# Patient Record
Sex: MALE | Race: WHITE | NOT HISPANIC OR LATINO | Employment: OTHER | URBAN - METROPOLITAN AREA
[De-identification: names, ages, dates, MRNs, and addresses within clinical notes are randomized per-mention and may not be internally consistent; named-entity substitution may affect disease eponyms.]

---

## 2017-02-15 ENCOUNTER — OFFICE VISIT (OUTPATIENT)
Dept: MEDICAL GROUP | Facility: MEDICAL CENTER | Age: 67
End: 2017-02-15
Payer: MEDICARE

## 2017-02-15 VITALS
HEART RATE: 60 BPM | OXYGEN SATURATION: 96 % | WEIGHT: 238 LBS | BODY MASS INDEX: 28.98 KG/M2 | TEMPERATURE: 98.4 F | SYSTOLIC BLOOD PRESSURE: 130 MMHG | HEIGHT: 76 IN | DIASTOLIC BLOOD PRESSURE: 84 MMHG

## 2017-02-15 DIAGNOSIS — Z13.0 SCREENING FOR DEFICIENCY ANEMIA: ICD-10-CM

## 2017-02-15 DIAGNOSIS — R13.14 PHARYNGOESOPHAGEAL DYSPHAGIA: ICD-10-CM

## 2017-02-15 DIAGNOSIS — Z23 NEED FOR VACCINATION: ICD-10-CM

## 2017-02-15 DIAGNOSIS — Z12.11 SCREENING FOR COLON CANCER: ICD-10-CM

## 2017-02-15 DIAGNOSIS — Z13.29 SCREENING FOR THYROID DISORDER: ICD-10-CM

## 2017-02-15 DIAGNOSIS — E78.5 DYSLIPIDEMIA: ICD-10-CM

## 2017-02-15 DIAGNOSIS — Z13.220 SCREENING FOR HYPERLIPIDEMIA: ICD-10-CM

## 2017-02-15 DIAGNOSIS — K21.9 GASTROESOPHAGEAL REFLUX DISEASE, ESOPHAGITIS PRESENCE NOT SPECIFIED: ICD-10-CM

## 2017-02-15 DIAGNOSIS — R73.03 PREDIABETES: ICD-10-CM

## 2017-02-15 DIAGNOSIS — I10 ESSENTIAL HYPERTENSION: ICD-10-CM

## 2017-02-15 DIAGNOSIS — R97.20 ELEVATED PSA: ICD-10-CM

## 2017-02-15 DIAGNOSIS — K22.2 ESOPHAGEAL STRICTURE: ICD-10-CM

## 2017-02-15 PROCEDURE — 90715 TDAP VACCINE 7 YRS/> IM: CPT | Performed by: FAMILY MEDICINE

## 2017-02-15 PROCEDURE — 99214 OFFICE O/P EST MOD 30 MIN: CPT | Mod: 25 | Performed by: FAMILY MEDICINE

## 2017-02-15 PROCEDURE — 90471 IMMUNIZATION ADMIN: CPT | Performed by: FAMILY MEDICINE

## 2017-02-15 RX ORDER — VALSARTAN AND HYDROCHLOROTHIAZIDE 320; 25 MG/1; MG/1
1 TABLET, FILM COATED ORAL DAILY
Qty: 90 TAB | Refills: 3 | Status: SHIPPED | OUTPATIENT
Start: 2017-02-15 | End: 2018-02-20 | Stop reason: SDUPTHER

## 2017-02-15 RX ORDER — ATORVASTATIN CALCIUM 40 MG/1
40 TABLET, FILM COATED ORAL DAILY
Qty: 90 TAB | Refills: 3 | Status: SHIPPED | OUTPATIENT
Start: 2017-02-15 | End: 2017-10-24

## 2017-02-15 RX ORDER — VALSARTAN AND HYDROCHLOROTHIAZIDE 320; 25 MG/1; MG/1
TABLET, FILM COATED ORAL
COMMUNITY
Start: 2016-02-16 | End: 2017-02-15 | Stop reason: SDUPTHER

## 2017-02-15 RX ORDER — ASPIRIN 81 MG/1
81 TABLET ORAL
COMMUNITY

## 2017-02-15 RX ORDER — VALSARTAN AND HYDROCHLOROTHIAZIDE 320; 25 MG/1; MG/1
TABLET, FILM COATED ORAL
COMMUNITY
Start: 2016-12-28 | End: 2017-02-15

## 2017-02-15 RX ORDER — ATORVASTATIN CALCIUM 40 MG/1
40 TABLET, FILM COATED ORAL
COMMUNITY
Start: 2016-02-16 | End: 2017-02-15 | Stop reason: SDUPTHER

## 2017-02-15 ASSESSMENT — PATIENT HEALTH QUESTIONNAIRE - PHQ9: CLINICAL INTERPRETATION OF PHQ2 SCORE: 0

## 2017-02-15 NOTE — ASSESSMENT & PLAN NOTE
Gets a burning in his throat at night.  Especially after eating tomato based products. This has worsened over the last year. He takes Tums when it really bothers him. He is fairly resistant to taking a PPI. He does have a history of difficulty swallowing and sometimes food getting stuck. He has had an esophageal stricture that was dilated but that's been more than 10 years ago. He denies any hematemesis. No unusual weight loss.

## 2017-02-22 NOTE — PROGRESS NOTES
Chief Complaint   Patient presents with   • Establish Care   • Medication Refill     Bolivar is here to establish care at this office. he is a previous patient of mine at the [facility].      Bolivar Sykes is a 66 y.o. male here to establish care and for evaluation and management of:        HPI:    GERD (gastroesophageal reflux disease)  Gets a burning in his throat at night.  Especially after eating tomato based products. This has worsened over the last year. He takes Tums when it really bothers him. He is fairly resistant to taking a PPI. He does have a history of difficulty swallowing and sometimes food getting stuck. He has had an esophageal stricture that was dilated but that's been more than 10 years ago. He denies any hematemesis. No unusual weight loss.    Prediabetes  Patient has not been monitoring his diet. He denies any polyuria, polyphagia or polydipsia.    Dyslipidemia  Patient has been taking his atorvastatin 40 mg daily. He denies any excess muscle pain, jaundice or dark urine.      Allergies   Allergen Reactions   • Morphine        Current medicines (including changes today)  Current Outpatient Prescriptions   Medication Sig Dispense Refill   • aspirin (ECOTRIN LOW STRENGTH) 81 MG EC tablet Take 81 mg by mouth.     • Cholecalciferol (PA VITAMIN D-3) 2000 UNIT Cap Take 2,000 Units by mouth.     • valsartan-hydrochlorothiazide (DIOVAN-HCT) 320-25 MG per tablet Take 1 Tab by mouth every day. 90 Tab 3   • metoprolol (LOPRESSOR) 25 MG Tab Take 1 Tab by mouth 2 times a day. 180 Tab 3   • atorvastatin (LIPITOR) 40 MG Tab Take 1 Tab by mouth every day. 90 Tab 3     No current facility-administered medications for this visit.     He  has a past medical history of Hyperlipidemia; Hypertension; Sleep apnea; Tear of lateral cartilage or meniscus of knee, current (7/25/2012); Prediabetes (2/15/2017); and GERD (gastroesophageal reflux disease) (2/15/2017).  He  has past surgical history that includes appendectomy;  "hernia repair; and knee arthroscopy (Bilateral).  Social History   Substance Use Topics   • Smoking status: Never Smoker    • Smokeless tobacco: Never Used   • Alcohol Use: 8.4 oz/week     14 Cans of beer per week     Social History     Social History Narrative     Family History   Problem Relation Age of Onset   • Heart Attack Brother    • Hypertension Brother    • Stroke Brother    • Arthritis Mother    • Heart Disease Mother    • Hypertension Mother    • Heart Disease Father    • Hypertension Father      Family Status   Relation Status Death Age   • Brother     • Mother     • Father           ROS  No fever or chills.  No nausea or vomiting.  No chest pain or palpitations.  No cough or SOB.  No pain with urination or hematuria.  No black or bloody stools.  All other systems reviewed and are negative     Objective:     Blood pressure 130/84, pulse 60, temperature 36.9 °C (98.4 °F), height 1.93 m (6' 3.98\"), weight 107.956 kg (238 lb), SpO2 96 %. Body mass index is 28.98 kg/(m^2).  Physical Exam:      Well developed, well nourished.  Alert, oriented in no acute distress.  Psych: Eye contact is good, speech goal directed, affect calm  Eyes: conjunctiva non-injected, sclera non-icteric.  Ears: Pinna normal. TM pearly gray.   Nose: Nares are patent.  Normal mucosa  Mouth: Oral mucous membranes pink and moist with no lesions.  Neck Supple.  No adenopathy or masses in the neck or supraclavicular regions. No thyromegaly  Lungs: clear to auscultation bilaterally with good excursion. No wheezes or rhonchi  CV: regular rate and rhythm. No murmur  Abdomen: soft, nontender, no masses or organomegaly.  No rebound or guarding  Ext: no edema, color normal, vascularity normal, temperature normal      Assessment and Plan:   The following treatment plan was discussed    1. Dyslipidemia  Check labs. Refill atorvastatin.  - COMP METABOLIC PANEL  - LIPID PANEL W/ CHOL/HDL RATIO  - atorvastatin (LIPITOR) 40 MG " Tab; Take 1 Tab by mouth every day.  Dispense: 90 Tab; Refill: 3    2. Essential hypertension  Good control. Continue current medications.  - COMP METABOLIC PANEL  - valsartan-hydrochlorothiazide (DIOVAN-HCT) 320-25 MG per tablet; Take 1 Tab by mouth every day.  Dispense: 90 Tab; Refill: 3  - metoprolol (LOPRESSOR) 25 MG Tab; Take 1 Tab by mouth 2 times a day.  Dispense: 180 Tab; Refill: 3    3. Prediabetes  Dietary counseling done. Recheck hemoglobin A1c.  - COMP METABOLIC PANEL  - HGB A1C WITH EAG ESTIMATION  - MICROALB/CREAT RATIO RAND. UR    4. Gastroesophageal reflux disease, esophagitis presence not specified  Refer to GI. Given H. pylori. Recommend starting omeprazole 20 mg daily.  - REFERRAL TO GASTROENTEROLOGY  - CBC WITH DIFFERENTIAL  - H.PYLORI STOOL ANTIGEN    5. Screening for deficiency anemia  Screening labs ordered.  Await results for counseling.    - CBC WITH DIFFERENTIAL    6. Screening for hyperlipidemia  Screening labs ordered.  Await results for counseling.      7. Screening for thyroid disorder  Screening labs ordered.  Await results for counseling.    - TSH+FREE T4    8. Elevated PSA  Repeat PSA  - PROSTATE SPECIFIC AG    9. Screening for colon cancer     refer to gastroenterology- REFERRAL TO GASTROENTEROLOGY    10. Esophageal stricture  Referring gastroenterology  - REFERRAL TO GASTROENTEROLOGY  - H.PYLORI STOOL ANTIGEN    11. Pharyngoesophageal dysphagia  Refer to gastroenterology  - REFERRAL TO GASTROENTEROLOGY    12. Need for vaccination  Vaccines updated  - TDAP VACCINE =>6YO IM  - zoster vaccine live, PF, (ZOSTAVAX) 37753 UNT/0.65ML injection; Inject 0.65 mL as instructed Once for 1 dose.  Dispense: 0.65 mL; Refill: 0      Records reviewed    Any change or worsening of signs or symptoms, patient encouraged to follow-up or report to the emergency room for further evaluation. Patient understands and agrees.    Followup: Return in about 6 months (around 8/15/2017).

## 2017-02-22 NOTE — ASSESSMENT & PLAN NOTE
Patient has been taking his atorvastatin 40 mg daily. He denies any excess muscle pain, jaundice or dark urine.

## 2017-02-27 RX ORDER — AMOXICILLIN AND CLAVULANATE POTASSIUM 875; 125 MG/1; MG/1
1 TABLET, FILM COATED ORAL 2 TIMES DAILY
Qty: 20 TAB | Refills: 0 | Status: SHIPPED | OUTPATIENT
Start: 2017-02-27 | End: 2017-12-22

## 2017-03-20 LAB
ALBUMIN SERPL-MCNC: 4 G/DL (ref 3.6–4.8)
ALBUMIN/CREAT UR: <2.7 MG/G CREAT (ref 0–30)
ALBUMIN/GLOB SERPL: 1.5 {RATIO} (ref 1.2–2.2)
ALP SERPL-CCNC: 78 IU/L (ref 39–117)
ALT SERPL-CCNC: 33 IU/L (ref 0–44)
AST SERPL-CCNC: 24 IU/L (ref 0–40)
BASOPHILS # BLD AUTO: 0 X10E3/UL (ref 0–0.2)
BASOPHILS NFR BLD AUTO: 0 %
BILIRUB SERPL-MCNC: 0.6 MG/DL (ref 0–1.2)
BUN SERPL-MCNC: 16 MG/DL (ref 8–27)
BUN/CREAT SERPL: 22 (ref 10–22)
CALCIUM SERPL-MCNC: 8.8 MG/DL (ref 8.6–10.2)
CHLORIDE SERPL-SCNC: 105 MMOL/L (ref 96–106)
CHOLEST SERPL-MCNC: 151 MG/DL (ref 100–199)
CHOLEST/HDLC SERPL: 4 RATIO UNITS (ref 0–5)
CO2 SERPL-SCNC: 20 MMOL/L (ref 18–29)
COMMENT 011824: ABNORMAL
CREAT SERPL-MCNC: 0.72 MG/DL (ref 0.76–1.27)
CREAT UR-MCNC: 109.9 MG/DL
EOSINOPHIL # BLD AUTO: 0.3 X10E3/UL (ref 0–0.4)
EOSINOPHIL NFR BLD AUTO: 6 %
ERYTHROCYTE [DISTWIDTH] IN BLOOD BY AUTOMATED COUNT: 13.1 % (ref 12.3–15.4)
EST. AVERAGE GLUCOSE BLD GHB EST-MCNC: 131 MG/DL
GLOBULIN SER CALC-MCNC: 2.7 G/DL (ref 1.5–4.5)
GLUCOSE SERPL-MCNC: 116 MG/DL (ref 65–99)
H PYLORI AG STL QL IA: NEGATIVE
HBA1C MFR BLD: 6.2 % (ref 4.8–5.6)
HCT VFR BLD AUTO: 46.1 % (ref 37.5–51)
HDLC SERPL-MCNC: 38 MG/DL
HGB BLD-MCNC: 14.9 G/DL (ref 12.6–17.7)
IMM GRANULOCYTES # BLD: 0 X10E3/UL (ref 0–0.1)
IMM GRANULOCYTES NFR BLD: 0 %
IMMATURE CELLS  115398: NORMAL
LDLC SERPL CALC-MCNC: 95 MG/DL (ref 0–99)
LYMPHOCYTES # BLD AUTO: 1.5 X10E3/UL (ref 0.7–3.1)
LYMPHOCYTES NFR BLD AUTO: 27 %
MCH RBC QN AUTO: 30.7 PG (ref 26.6–33)
MCHC RBC AUTO-ENTMCNC: 32.3 G/DL (ref 31.5–35.7)
MCV RBC AUTO: 95 FL (ref 79–97)
MICROALBUMIN UR-MCNC: <3 UG/ML
MONOCYTES # BLD AUTO: 0.6 X10E3/UL (ref 0.1–0.9)
MONOCYTES NFR BLD AUTO: 10 %
MORPHOLOGY BLD-IMP: NORMAL
NEUTROPHILS # BLD AUTO: 3.2 X10E3/UL (ref 1.4–7)
NEUTROPHILS NFR BLD AUTO: 57 %
NRBC BLD AUTO-RTO: NORMAL %
PLATELET # BLD AUTO: 242 X10E3/UL (ref 150–379)
POTASSIUM SERPL-SCNC: 4.4 MMOL/L (ref 3.5–5.2)
PROT SERPL-MCNC: 6.7 G/DL (ref 6–8.5)
PSA SERPL-MCNC: 3.1 NG/ML (ref 0–4)
RBC # BLD AUTO: 4.86 X10E6/UL (ref 4.14–5.8)
SODIUM SERPL-SCNC: 142 MMOL/L (ref 134–144)
T4 FREE SERPL-MCNC: 1.16 NG/DL (ref 0.82–1.77)
TRIGL SERPL-MCNC: 90 MG/DL (ref 0–149)
TSH SERPL DL<=0.005 MIU/L-ACNC: 1.12 UIU/ML (ref 0.45–4.5)
VLDLC SERPL CALC-MCNC: 18 MG/DL (ref 5–40)
WBC # BLD AUTO: 5.7 X10E3/UL (ref 3.4–10.8)

## 2017-10-24 ENCOUNTER — HOSPITAL ENCOUNTER (OUTPATIENT)
Dept: RADIOLOGY | Facility: MEDICAL CENTER | Age: 67
End: 2017-10-24
Attending: FAMILY MEDICINE
Payer: MEDICARE

## 2017-10-24 ENCOUNTER — HOSPITAL ENCOUNTER (OUTPATIENT)
Dept: LAB | Facility: MEDICAL CENTER | Age: 67
End: 2017-10-24
Attending: FAMILY MEDICINE
Payer: MEDICARE

## 2017-10-24 ENCOUNTER — OFFICE VISIT (OUTPATIENT)
Dept: MEDICAL GROUP | Facility: MEDICAL CENTER | Age: 67
End: 2017-10-24
Payer: MEDICARE

## 2017-10-24 VITALS
OXYGEN SATURATION: 96 % | RESPIRATION RATE: 16 BRPM | BODY MASS INDEX: 28.01 KG/M2 | TEMPERATURE: 98 F | WEIGHT: 230 LBS | HEIGHT: 76 IN | HEART RATE: 60 BPM | DIASTOLIC BLOOD PRESSURE: 84 MMHG | SYSTOLIC BLOOD PRESSURE: 132 MMHG

## 2017-10-24 DIAGNOSIS — M79.10 MYALGIA: ICD-10-CM

## 2017-10-24 DIAGNOSIS — M25.512 CHRONIC PAIN OF BOTH SHOULDERS: ICD-10-CM

## 2017-10-24 DIAGNOSIS — M25.552 BILATERAL HIP PAIN: ICD-10-CM

## 2017-10-24 DIAGNOSIS — G89.29 CHRONIC PAIN OF BOTH SHOULDERS: ICD-10-CM

## 2017-10-24 DIAGNOSIS — M25.551 BILATERAL HIP PAIN: ICD-10-CM

## 2017-10-24 DIAGNOSIS — M25.511 CHRONIC PAIN OF BOTH SHOULDERS: ICD-10-CM

## 2017-10-24 LAB
BASOPHILS # BLD AUTO: 0.4 % (ref 0–1.8)
BASOPHILS # BLD: 0.03 K/UL (ref 0–0.12)
EOSINOPHIL # BLD AUTO: 0.23 K/UL (ref 0–0.51)
EOSINOPHIL NFR BLD: 3 % (ref 0–6.9)
ERYTHROCYTE [DISTWIDTH] IN BLOOD BY AUTOMATED COUNT: 42.4 FL (ref 35.9–50)
ERYTHROCYTE [SEDIMENTATION RATE] IN BLOOD BY WESTERGREN METHOD: 17 MM/HOUR (ref 0–20)
HCT VFR BLD AUTO: 45.6 % (ref 42–52)
HGB BLD-MCNC: 15.3 G/DL (ref 14–18)
IMM GRANULOCYTES # BLD AUTO: 0.03 K/UL (ref 0–0.11)
IMM GRANULOCYTES NFR BLD AUTO: 0.4 % (ref 0–0.9)
LYMPHOCYTES # BLD AUTO: 1.43 K/UL (ref 1–4.8)
LYMPHOCYTES NFR BLD: 18.5 % (ref 22–41)
MCH RBC QN AUTO: 30.7 PG (ref 27–33)
MCHC RBC AUTO-ENTMCNC: 33.6 G/DL (ref 33.7–35.3)
MCV RBC AUTO: 91.4 FL (ref 81.4–97.8)
MONOCYTES # BLD AUTO: 0.99 K/UL (ref 0–0.85)
MONOCYTES NFR BLD AUTO: 12.8 % (ref 0–13.4)
NEUTROPHILS # BLD AUTO: 5 K/UL (ref 1.82–7.42)
NEUTROPHILS NFR BLD: 64.9 % (ref 44–72)
NRBC # BLD AUTO: 0 K/UL
NRBC BLD AUTO-RTO: 0 /100 WBC
PLATELET # BLD AUTO: 268 K/UL (ref 164–446)
PMV BLD AUTO: 10.4 FL (ref 9–12.9)
RBC # BLD AUTO: 4.99 M/UL (ref 4.7–6.1)
RHEUMATOID FACT SER IA-ACNC: 31 IU/ML (ref 0–14)
WBC # BLD AUTO: 7.7 K/UL (ref 4.8–10.8)

## 2017-10-24 PROCEDURE — 85025 COMPLETE CBC W/AUTO DIFF WBC: CPT

## 2017-10-24 PROCEDURE — 99214 OFFICE O/P EST MOD 30 MIN: CPT | Performed by: FAMILY MEDICINE

## 2017-10-24 PROCEDURE — 73522 X-RAY EXAM HIPS BI 3-4 VIEWS: CPT

## 2017-10-24 PROCEDURE — 85652 RBC SED RATE AUTOMATED: CPT

## 2017-10-24 PROCEDURE — 36415 COLL VENOUS BLD VENIPUNCTURE: CPT

## 2017-10-24 PROCEDURE — 86038 ANTINUCLEAR ANTIBODIES: CPT

## 2017-10-24 PROCEDURE — 73030 X-RAY EXAM OF SHOULDER: CPT | Mod: RT

## 2017-10-24 PROCEDURE — 73030 X-RAY EXAM OF SHOULDER: CPT | Mod: LT

## 2017-10-24 PROCEDURE — 86431 RHEUMATOID FACTOR QUANT: CPT

## 2017-10-24 RX ORDER — DICLOFENAC SODIUM 75 MG/1
75 TABLET, DELAYED RELEASE ORAL 2 TIMES DAILY PRN
Qty: 60 TAB | Refills: 3 | Status: SHIPPED | OUTPATIENT
Start: 2017-10-24 | End: 2018-01-18 | Stop reason: SDUPTHER

## 2017-10-24 NOTE — PATIENT INSTRUCTIONS
Arthritis, Nonspecific  Arthritis is inflammation of a joint. This usually means pain, redness, warmth or swelling are present. One or more joints may be involved. There are a number of types of arthritis. Your caregiver may not be able to tell what type of arthritis you have right away.  CAUSES   The most common cause of arthritis is the wear and tear on the joint (osteoarthritis). This causes damage to the cartilage, which can break down over time. The knees, hips, back and neck are most often affected by this type of arthritis.  Other types of arthritis and common causes of joint pain include:  · Sprains and other injuries near the joint. Sometimes minor sprains and injuries cause pain and swelling that develop hours later.  · Rheumatoid arthritis. This affects hands, feet and knees. It usually affects both sides of your body at the same time. It is often associated with chronic ailments, fever, weight loss and general weakness.  · Crystal arthritis. Gout and pseudo gout can cause occasional acute severe pain, redness and swelling in the foot, ankle, or knee.  · Infectious arthritis. Bacteria can get into a joint through a break in overlying skin. This can cause infection of the joint. Bacteria and viruses can also spread through the blood and affect your joints.  · Drug, infectious and allergy reactions. Sometimes joints can become mildly painful and slightly swollen with these types of illnesses.  SYMPTOMS   · Pain is the main symptom.  · Your joint or joints can also be red, swollen and warm or hot to the touch.  · You may have a fever with certain types of arthritis, or even feel overall ill.  · The joint with arthritis will hurt with movement. Stiffness is present with some types of arthritis.  DIAGNOSIS   Your caregiver will suspect arthritis based on your description of your symptoms and on your exam. Testing may be needed to find the type of arthritis:  · Blood and sometimes urine tests.  · X-ray tests  and sometimes CT or MRI scans.  · Removal of fluid from the joint (arthrocentesis) is done to check for bacteria, crystals or other causes. Your caregiver (or a specialist) will numb the area over the joint with a local anesthetic, and use a needle to remove joint fluid for examination. This procedure is only minimally uncomfortable.  · Even with these tests, your caregiver may not be able to tell what kind of arthritis you have. Consultation with a specialist (rheumatologist) may be helpful.  TREATMENT   Your caregiver will discuss with you treatment specific to your type of arthritis. If the specific type cannot be determined, then the following general recommendations may apply.  Treatment of severe joint pain includes:  · Rest.  · Elevation.  · Anti-inflammatory medication (for example, ibuprofen) may be prescribed. Avoiding activities that cause increased pain.  · Only take over-the-counter or prescription medicines for pain and discomfort as recommended by your caregiver.  · Cold packs over an inflamed joint may be used for 10 to 15 minutes every hour. Hot packs sometimes feel better, but do not use overnight. Do not use hot packs if you are diabetic without your caregiver's permission.  · A cortisone shot into arthritic joints may help reduce pain and swelling.  · Any acute arthritis that gets worse over the next 1 to 2 days needs to be looked at to be sure there is no joint infection.  Long-term arthritis treatment involves modifying activities and lifestyle to reduce joint stress jarring. This can include weight loss. Also, exercise is needed to nourish the joint cartilage and remove waste. This helps keep the muscles around the joint strong.  HOME CARE INSTRUCTIONS   · Do not take aspirin to relieve pain if gout is suspected. This elevates uric acid levels.  · Only take over-the-counter or prescription medicines for pain, discomfort or fever as directed by your caregiver.  · Rest the joint as much as  possible.  · If your joint is swollen, keep it elevated.  · Use crutches if the painful joint is in your leg.  · Drinking plenty of fluids may help for certain types of arthritis.  · Follow your caregiver's dietary instructions.  · Try low-impact exercise such as:  ¨ Swimming.  ¨ Water aerobics.  ¨ Biking.  ¨ Walking.  · Morning stiffness is often relieved by a warm shower.  · Put your joints through regular range-of-motion.  SEEK MEDICAL CARE IF:   · You do not feel better in 24 hours or are getting worse.  · You have side effects to medications, or are not getting better with treatment.  SEEK IMMEDIATE MEDICAL CARE IF:   · You have a fever.  · You develop severe joint pain, swelling or redness.  · Many joints are involved and become painful and swollen.  · There is severe back pain and/or leg weakness.  · You have loss of bowel or bladder control.     This information is not intended to replace advice given to you by your health care provider. Make sure you discuss any questions you have with your health care provider.     Document Released: 01/25/2006 Document Revised: 01/08/2016 Document Reviewed: 03/14/2016  [employer] Patient Education ©2016 [employer]

## 2017-10-24 NOTE — ASSESSMENT & PLAN NOTE
Started in July.  Pain in both shoulders and hips.  Worse when he has been sitting.  In June he cut and stacked 7 cords of wood.  Pain is worse in the morning,

## 2017-10-25 PROBLEM — M05.80 POLYARTHRITIS WITH POSITIVE RHEUMATOID FACTOR (HCC): Status: ACTIVE | Noted: 2017-10-25

## 2017-10-25 LAB — NUCLEAR IGG SER QL IA: NORMAL

## 2017-10-31 NOTE — PROGRESS NOTES
Subjective:     Chief Complaint   Patient presents with   • Shoulder Pain   • Muscle Pain       Bolivar MANOLO Sykes is a 67 y.o. male here today for evaluation and management of:    Myalgia  Started in July.  Pain in both shoulders and hips.  Worse when he has been sitting.  In June he cut and stacked 7 cords of wood.  Pain is worse in the morning,    Bilateral hip pain  Patient complains of bilateral hip pain and shoulder pain over the last year. It seems to be worsening. He denies any redness or swelling but it does affect his activities. As he is having muscle aches in his back muscles. He denies any rashes or known tick bites. No fever or chills. No unusual weight loss.       Allergies   Allergen Reactions   • Morphine        Current medicines (including changes today)  Current Outpatient Prescriptions   Medication Sig Dispense Refill   • diclofenac EC (VOLTAREN) 75 MG Tablet Delayed Response Take 1 Tab by mouth 2 times a day as needed (pain). 60 Tab 3   • amoxicillin-clavulanate (AUGMENTIN) 875-125 MG Tab Take 1 Tab by mouth 2 times a day. 20 Tab 0   • aspirin (ECOTRIN LOW STRENGTH) 81 MG EC tablet Take 81 mg by mouth.     • Cholecalciferol (PA VITAMIN D-3) 2000 UNIT Cap Take 2,000 Units by mouth.     • valsartan-hydrochlorothiazide (DIOVAN-HCT) 320-25 MG per tablet Take 1 Tab by mouth every day. 90 Tab 3   • metoprolol (LOPRESSOR) 25 MG Tab Take 1 Tab by mouth 2 times a day. 180 Tab 3     No current facility-administered medications for this visit.        He  has a past medical history of GERD (gastroesophageal reflux disease) (2/15/2017); Hyperlipidemia; Hypertension; Prediabetes (2/15/2017); Sleep apnea; and Tear of lateral cartilage or meniscus of knee, current (7/25/2012).    Patient Active Problem List    Diagnosis Date Noted   • Essential hypertension 08/20/2013     Priority: High   • Dyslipidemia 08/20/2013     Priority: High   • CESAR (obstructive sleep apnea) 08/20/2013     Priority: High   • Family  "history of premature CAD 08/20/2013     Priority: High   • Tear of lateral cartilage or meniscus of knee, current 07/25/2012     Priority: Low   • Polyarthritis with positive rheumatoid factor (CMS-formerly [facility]) 10/25/2017   • Myalgia 10/24/2017   • Chronic pain of both shoulders 10/24/2017   • Bilateral hip pain 10/24/2017   • Prediabetes 02/15/2017   • GERD (gastroesophageal reflux disease) 02/15/2017   • Pharyngoesophageal dysphagia 02/15/2017   • Esophageal stricture 02/15/2017       ROS   No fever or chills.  No nausea or vomiting.  No chest pain or palpitations.  No cough or SOB.  No pain with urination or hematuria.  No black or bloody stools.       Objective:     Blood pressure 132/84, pulse 60, temperature 36.7 °C (98 °F), resp. rate 16, height 1.93 m (6' 3.98\"), weight 104.3 kg (230 lb), SpO2 96 %. Body mass index is 28.01 kg/m².   Physical Exam:  Well developed, well nourished.  Alert, oriented in no acute distress.  Eye contact is good, speech goal directed, affect calm  Eyes: conjunctiva non-injected, sclera non-icteric.  Neck Supple.  No adenopathy or masses in the neck or supraclavicular regions. No thyromegaly  Lungs: clear to auscultation bilaterally with good excursion. No wheezes or rhonchi  CV: regular rate and rhythm. No murmur  Neck exam: No spinal tenderness to palpation. Normal flexion, extension and lateral rotation ROM. Spurling's test Negative.   Shoulder/arm exam: No deformity, erythema, edema or ecchymosis. Tenderness to palpation at AC joint bilaterally . ROM intact within normal limits. Hawkin's test negative. Neer's test negative.  Empty can test negative. Drop arm test negative. 5/5 strength bilaterally.   Hip: Tenderness to palpation on . lateral compression normal flexion, extension, abduction and adduction ROM. No pain with axial load or internal rotation.   Ext: no edema, color normal, vascularity normal, temperature normal          Assessment and Plan:   The following treatment plan was " discussed    1. Myalgia  Rule out autoimmune arthritis. Voltaren as needed for pain. Await results  - CBC WITH DIFFERENTIAL; Future  - CHRISTIAN ANTIBODY WITH REFLEX; Future  - WESTERGREN SED RATE; Future  - RHEUMATOID ARTHRITIS FACTOR; Future  - diclofenac EC (VOLTAREN) 75 MG Tablet Delayed Response; Take 1 Tab by mouth 2 times a day as needed (pain).  Dispense: 60 Tab; Refill: 3    2. Chronic pain of both shoulders  X-ray to assess  - DX-SHOULDER 2+ LEFT; Future  - DX-SHOULDER 2+ RIGHT; Future    3. Bilateral hip pain  X-ray to assess  - DX-HIP-BILATERAL-WITH PELVIS-3/4 VIEWS; Future    Any change or worsening of signs or symptoms, patient encouraged to follow-up or report to the emergency room for further evaluation. Patient understands and agrees.    Followup: Return if symptoms worsen or fail to improve.

## 2017-10-31 NOTE — ASSESSMENT & PLAN NOTE
Patient complains of bilateral hip pain and shoulder pain over the last year. It seems to be worsening. He denies any redness or swelling but it does affect his activities. As he is having muscle aches in his back muscles. He denies any rashes or known tick bites. No fever or chills. No unusual weight loss.

## 2017-11-03 DIAGNOSIS — M05.80 POLYARTHRITIS WITH POSITIVE RHEUMATOID FACTOR (HCC): ICD-10-CM

## 2017-12-06 RX ORDER — AZITHROMYCIN 250 MG/1
TABLET, FILM COATED ORAL
Qty: 6 TAB | Refills: 0 | Status: SHIPPED | OUTPATIENT
Start: 2017-12-06 | End: 2017-12-11

## 2017-12-26 ENCOUNTER — OFFICE VISIT (OUTPATIENT)
Dept: MEDICAL GROUP | Facility: MEDICAL CENTER | Age: 67
End: 2017-12-26
Payer: MEDICARE

## 2017-12-26 ENCOUNTER — HOSPITAL ENCOUNTER (OUTPATIENT)
Dept: LAB | Facility: MEDICAL CENTER | Age: 67
End: 2017-12-26
Attending: INTERNAL MEDICINE
Payer: MEDICARE

## 2017-12-26 VITALS
BODY MASS INDEX: 28.98 KG/M2 | TEMPERATURE: 98 F | OXYGEN SATURATION: 97 % | DIASTOLIC BLOOD PRESSURE: 82 MMHG | SYSTOLIC BLOOD PRESSURE: 134 MMHG | HEIGHT: 76 IN | HEART RATE: 71 BPM | RESPIRATION RATE: 16 BRPM | WEIGHT: 238 LBS

## 2017-12-26 DIAGNOSIS — H90.0 CONDUCTIVE HEARING LOSS, BILATERAL: ICD-10-CM

## 2017-12-26 DIAGNOSIS — R89.9 ABNORMAL LABORATORY TEST RESULT: ICD-10-CM

## 2017-12-26 DIAGNOSIS — H61.23 BILATERAL IMPACTED CERUMEN: ICD-10-CM

## 2017-12-26 LAB
CRP SERPL HS-MCNC: 0.07 MG/DL (ref 0–0.75)
ERYTHROCYTE [SEDIMENTATION RATE] IN BLOOD BY WESTERGREN METHOD: 6 MM/HOUR (ref 0–20)

## 2017-12-26 PROCEDURE — 99214 OFFICE O/P EST MOD 30 MIN: CPT | Performed by: FAMILY MEDICINE

## 2017-12-26 PROCEDURE — 86140 C-REACTIVE PROTEIN: CPT

## 2017-12-26 PROCEDURE — 36415 COLL VENOUS BLD VENIPUNCTURE: CPT

## 2017-12-26 PROCEDURE — 85652 RBC SED RATE AUTOMATED: CPT

## 2017-12-26 PROCEDURE — 86200 CCP ANTIBODY: CPT

## 2017-12-26 NOTE — PROGRESS NOTES
Subjective:     Chief Complaint   Patient presents with   • Ear Fullness       Bolivar MANOLO Sykes is a 67 y.o. male here today for evaluation and management of:    Conductive hearing loss, bilateral  Patient complains of significant hearing loss. He has been told that he had a lot of wax in his ears in the past but didn't want to have them washed out. He reports that he was going over the past and with the change in altitude he had abrupt loss of hearing bilaterally. He denies any pain or rhinorrhea.       Allergies   Allergen Reactions   • Morphine        Current medicines (including changes today)  Current Outpatient Prescriptions   Medication Sig Dispense Refill   • atorvastatin (LIPITOR) 40 MG Tab      • diclofenac EC (VOLTAREN) 75 MG Tablet Delayed Response Take 1 Tab by mouth 2 times a day as needed (pain). 60 Tab 3   • aspirin (ECOTRIN LOW STRENGTH) 81 MG EC tablet Take 81 mg by mouth.     • Cholecalciferol (PA VITAMIN D-3) 2000 UNIT Cap Take 2,000 Units by mouth.     • valsartan-hydrochlorothiazide (DIOVAN-HCT) 320-25 MG per tablet Take 1 Tab by mouth every day. 90 Tab 3   • metoprolol (LOPRESSOR) 25 MG Tab Take 1 Tab by mouth 2 times a day. 180 Tab 3     No current facility-administered medications for this visit.        He  has a past medical history of GERD (gastroesophageal reflux disease) (2/15/2017); Hyperlipidemia; Hypertension; Prediabetes (2/15/2017); Sleep apnea; and Tear of lateral cartilage or meniscus of knee, current (7/25/2012).    Patient Active Problem List    Diagnosis Date Noted   • Essential hypertension 08/20/2013     Priority: High   • Dyslipidemia 08/20/2013     Priority: High   • CESAR (obstructive sleep apnea) 08/20/2013     Priority: High   • Family history of premature CAD 08/20/2013     Priority: High   • Tear of lateral cartilage or meniscus of knee, current 07/25/2012     Priority: Low   • Conductive hearing loss, bilateral 12/26/2017   • Bilateral impacted cerumen 12/26/2017   •  "Polyarthritis with positive rheumatoid factor (CMS-HCC) 10/25/2017   • Myalgia 10/24/2017   • Chronic pain of both shoulders 10/24/2017   • Bilateral hip pain 10/24/2017   • Prediabetes 02/15/2017   • GERD (gastroesophageal reflux disease) 02/15/2017   • Pharyngoesophageal dysphagia 02/15/2017   • Esophageal stricture 02/15/2017       ROS   No fever or chills.  No nausea or vomiting.  No chest pain or palpitations.  No cough or SOB.  No pain with urination or hematuria.  No black or bloody stools.       Objective:     Blood pressure 134/82, pulse 71, temperature 36.7 °C (98 °F), resp. rate 16, height 1.93 m (6' 4\"), weight 108 kg (238 lb), SpO2 97 %. Body mass index is 28.97 kg/m².   Physical Exam:  Well developed, well nourished.  Alert, oriented in no acute distress.  Eye contact is good, speech goal directed, affect calm  Eyes: conjunctiva non-injected, sclera non-icteric.  Ears: Pinna normal. Canals totally occluded with large amount of wax bilaterally. After ear wash TMs are visualized and are pearly gray.   Lungs: clear to auscultation bilaterally with good excursion. No wheezes or rhonchi  CV: regular rate and rhythm. No murmur      Assessment and Plan:   The following treatment plan was discussed    1. Conductive hearing loss, bilateral  Ear wash done with good effect. Symptoms resolved    2. Bilateral impacted cerumen  Ear wash done with good effect. Discussed using MARTIN Kelley monthly. Follow-up as needed    Any change or worsening of signs or symptoms, patient encouraged to follow-up or report to the emergency room for further evaluation. Patient understands and agrees.    Followup: Return if symptoms worsen or fail to improve.           "

## 2017-12-26 NOTE — ASSESSMENT & PLAN NOTE
Patient complains of significant hearing loss. He has been told that he had a lot of wax in his ears in the past but didn't want to have them washed out. He reports that he was going over the past and with the change in altitude he had abrupt loss of hearing bilaterally. He denies any pain or rhinorrhea.

## 2017-12-27 LAB — CCP IGG SERPL-ACNC: 6 UNITS (ref 0–19)

## 2018-01-18 DIAGNOSIS — M79.10 MYALGIA: ICD-10-CM

## 2018-01-18 RX ORDER — DICLOFENAC SODIUM 75 MG/1
75 TABLET, DELAYED RELEASE ORAL 2 TIMES DAILY PRN
Qty: 60 TAB | Refills: 3 | Status: SHIPPED | OUTPATIENT
Start: 2018-01-18 | End: 2018-02-20 | Stop reason: SDUPTHER

## 2018-01-18 NOTE — TELEPHONE ENCOUNTER
----- Message from Bolivar Sykes sent at 1/18/2018  7:21 AM PST -----  Regarding: Prescription Question  Contact: [phone]  Syed Castellano, could you please send my prescription for Diclofenac to my mail in [employer] insurance.  Thanks Bolivar Sykes

## 2018-02-20 ENCOUNTER — OFFICE VISIT (OUTPATIENT)
Dept: MEDICAL GROUP | Facility: MEDICAL CENTER | Age: 68
End: 2018-02-20
Payer: MEDICARE

## 2018-02-20 VITALS
RESPIRATION RATE: 16 BRPM | WEIGHT: 243 LBS | HEART RATE: 60 BPM | TEMPERATURE: 97.1 F | OXYGEN SATURATION: 96 % | HEIGHT: 76 IN | BODY MASS INDEX: 29.59 KG/M2 | SYSTOLIC BLOOD PRESSURE: 124 MMHG | DIASTOLIC BLOOD PRESSURE: 78 MMHG

## 2018-02-20 DIAGNOSIS — M25.551 BILATERAL HIP PAIN: ICD-10-CM

## 2018-02-20 DIAGNOSIS — R73.03 PREDIABETES: ICD-10-CM

## 2018-02-20 DIAGNOSIS — M25.511 CHRONIC PAIN OF BOTH SHOULDERS: ICD-10-CM

## 2018-02-20 DIAGNOSIS — G47.33 OSA (OBSTRUCTIVE SLEEP APNEA): ICD-10-CM

## 2018-02-20 DIAGNOSIS — I10 ESSENTIAL HYPERTENSION: ICD-10-CM

## 2018-02-20 DIAGNOSIS — Z12.11 SCREENING FOR COLON CANCER: ICD-10-CM

## 2018-02-20 DIAGNOSIS — K21.9 GASTROESOPHAGEAL REFLUX DISEASE, ESOPHAGITIS PRESENCE NOT SPECIFIED: ICD-10-CM

## 2018-02-20 DIAGNOSIS — M25.552 BILATERAL HIP PAIN: ICD-10-CM

## 2018-02-20 DIAGNOSIS — M05.80 POLYARTHRITIS WITH POSITIVE RHEUMATOID FACTOR (HCC): ICD-10-CM

## 2018-02-20 DIAGNOSIS — E78.5 DYSLIPIDEMIA: ICD-10-CM

## 2018-02-20 DIAGNOSIS — Z00.00 MEDICARE ANNUAL WELLNESS VISIT, SUBSEQUENT: ICD-10-CM

## 2018-02-20 DIAGNOSIS — M79.10 MYALGIA: ICD-10-CM

## 2018-02-20 DIAGNOSIS — G89.29 CHRONIC PAIN OF BOTH SHOULDERS: ICD-10-CM

## 2018-02-20 DIAGNOSIS — M25.512 CHRONIC PAIN OF BOTH SHOULDERS: ICD-10-CM

## 2018-02-20 PROBLEM — R13.14 PHARYNGOESOPHAGEAL DYSPHAGIA: Status: RESOLVED | Noted: 2017-02-15 | Resolved: 2018-02-20

## 2018-02-20 PROBLEM — H90.0 CONDUCTIVE HEARING LOSS, BILATERAL: Status: RESOLVED | Noted: 2017-12-26 | Resolved: 2018-02-20

## 2018-02-20 PROBLEM — H61.23 BILATERAL IMPACTED CERUMEN: Status: RESOLVED | Noted: 2017-12-26 | Resolved: 2018-02-20

## 2018-02-20 PROCEDURE — G0439 PPPS, SUBSEQ VISIT: HCPCS | Performed by: FAMILY MEDICINE

## 2018-02-20 RX ORDER — VALSARTAN AND HYDROCHLOROTHIAZIDE 320; 25 MG/1; MG/1
1 TABLET, FILM COATED ORAL DAILY
Qty: 90 TAB | Refills: 3 | Status: SHIPPED | OUTPATIENT
Start: 2018-02-20 | End: 2018-07-31

## 2018-02-20 RX ORDER — DICLOFENAC SODIUM 75 MG/1
75 TABLET, DELAYED RELEASE ORAL 2 TIMES DAILY PRN
Qty: 180 TAB | Refills: 3 | Status: SHIPPED | OUTPATIENT
Start: 2018-02-20 | End: 2019-02-13 | Stop reason: SDUPTHER

## 2018-02-20 RX ORDER — ATORVASTATIN CALCIUM 40 MG/1
40 TABLET, FILM COATED ORAL DAILY
Qty: 90 TAB | Refills: 3 | Status: SHIPPED | OUTPATIENT
Start: 2018-02-20 | End: 2019-02-13 | Stop reason: SDUPTHER

## 2018-02-20 ASSESSMENT — PATIENT HEALTH QUESTIONNAIRE - PHQ9: CLINICAL INTERPRETATION OF PHQ2 SCORE: 0

## 2018-02-20 ASSESSMENT — ACTIVITIES OF DAILY LIVING (ADL): BATHING_REQUIRES_ASSISTANCE: 0

## 2018-02-27 NOTE — PROGRESS NOTES
Chief Complaint   Patient presents with   • Annual Exam         HPI:  Bolivar Sykes is a 67 y.o. here for Medicare Annual Wellness Visit     Patient Active Problem List    Diagnosis Date Noted   • Essential hypertension 08/20/2013     Priority: High   • Dyslipidemia 08/20/2013     Priority: High   • CESAR (obstructive sleep apnea) 08/20/2013     Priority: High   • Family history of premature CAD 08/20/2013     Priority: High   • Tear of lateral cartilage or meniscus of knee, current 07/25/2012     Priority: Low   • Polyarthritis with positive rheumatoid factor (CMS-HCC) 10/25/2017   • Chronic pain of both shoulders 10/24/2017   • Bilateral hip pain 10/24/2017   • Prediabetes 02/15/2017   • GERD (gastroesophageal reflux disease) 02/15/2017   • Esophageal stricture 02/15/2017       Current Outpatient Prescriptions   Medication Sig Dispense Refill   • metoprolol (LOPRESSOR) 25 MG Tab Take 1 Tab by mouth 2 times a day. 180 Tab 3   • valsartan-hydrochlorothiazide (DIOVAN-HCT) 320-25 MG per tablet Take 1 Tab by mouth every day. 90 Tab 3   • atorvastatin (LIPITOR) 40 MG Tab Take 1 Tab by mouth every day. 90 Tab 3   • diclofenac EC (VOLTAREN) 75 MG Tablet Delayed Response Take 1 Tab by mouth 2 times a day as needed (pain). 180 Tab 3   • aspirin (ECOTRIN LOW STRENGTH) 81 MG EC tablet Take 81 mg by mouth.     • Cholecalciferol (PA VITAMIN D-3) 2000 UNIT Cap Take 2,000 Units by mouth.       No current facility-administered medications for this visit.             Current supplements as per medication list.       Allergies: Morphine    Current social contact/activities:      He  reports that he has never smoked. He has never used smokeless tobacco. He reports that he drinks about 8.4 oz of alcohol per week . He reports that he does not use drugs.  Counseling given: Not Answered        DPA/Advanced Directive:  Patient does not have an Advanced Directive.  A packet and workshop information was given on Advanced  Directives.      ROS:    Gait: Uses no assistive device   Ostomy: no   Other tubes: no   Amputations: no   Chronic oxygen use: no   Last eye exam:in the last 12 months   : Denies any urinary leakage during the last 6 months incontinence.       Screening:    Depression Screening    Little interest or pleasure in doing things?  0 - not at all  Feeling down, depressed , or hopeless? 0 - not at all  Patient Health Questionnaire Score: 0     If depressive symptoms identified deferred to follow up visit unless specifically addressed in assessment and plan.    Interpretation of PHQ-9 Total Score   Score Severity   1-4 No Depression   5-9 Mild Depression   10-14 Moderate Depression   15-19 Moderately Severe Depression   20-27 Severe Depression    Screening for Cognitive Impairment    Three Minute Recall (apple, watch, thu)  3/3    Draw clock face with all 12 numbers set to the hand to show 10 minutes past 11 o'clock  1    Cognitive concerns identified deferred for follow up unless specifically addressed in assessment and plan.    Fall Risk Assessment    Has the patient had two or more falls in the last year or any fall with injury in the last year?  No    Safety Assessment    Throw rugs on floor.  Yes  Handrails on all stairs.  Yes  Good lighting in all hallways.  Yes  Difficulty hearing.  No  Patient counseled about all safety risks that were identified.    Functional Assessment ADLs    Are there any barriers preventing you from cooking for yourself or meeting nutritional needs?  No.    Are there any barriers preventing you from driving safely or obtaining transportation?  No.    Are there any barriers preventing you from using a telephone or calling for help?  No.    Are there any barriers preventing you from shopping?  No.    Are there any barriers preventing you from taking care of your own finances?  No.    Are there any barriers preventing you from managing your medications?  No.    Are there any barriers  "preventing you from showering/bathing yourself?  No.    Are currently engaging any exercise or physical activity?  No.       Health Maintenance Summary                COLON CANCER SCREENING ANNUAL FIT Overdue 7/8/1999     IMM ZOSTER VACCINE Overdue 7/8/2009     IMM INFLUENZA Overdue 9/1/2017     IMM DTaP/Tdap/Td Vaccine Next Due 2/15/2027      Done 2/15/2017 Imm Admin: Tdap Vaccine          Patient Care Team:  Nona Mcmahan M.D. as PCP - General (Family Medicine)        Social History   Substance Use Topics   • Smoking status: Never Smoker   • Smokeless tobacco: Never Used   • Alcohol use 8.4 oz/week     14 Cans of beer per week     Family History   Problem Relation Age of Onset   • Heart Attack Brother    • Hypertension Brother    • Stroke Brother    • Arthritis Mother    • Heart Disease Mother    • Hypertension Mother    • Heart Disease Father    • Hypertension Father      He  has a past medical history of GERD (gastroesophageal reflux disease) (2/15/2017); Hyperlipidemia; Hypertension; Prediabetes (2/15/2017); Sleep apnea; and Tear of lateral cartilage or meniscus of knee, current (7/25/2012).   Past Surgical History:   Procedure Laterality Date   • APPENDECTOMY     • HERNIA REPAIR     • KNEE ARTHROSCOPY Bilateral     4 total       Exam:     Blood pressure 124/78, pulse 60, temperature 36.2 °C (97.1 °F), resp. rate 16, height 1.93 m (6' 4\"), weight 110.2 kg (243 lb), SpO2 96 %. Body mass index is 29.58 kg/m².    Hearing fair.    Dentition good  Alert, oriented in no acute distress.  Eye contact is good, speech goal directed, affect calm  Ears: Pinnae are normal. TMs are clear bilaterally  Nose: Nares are patent. No discharge  Throat: Mucous membranes are moist without erythema or exudate  Neck: Supple without adenopathy  Lungs: Clear to auscultation bilaterally without wheezes or rhonchi  CV: Regular rate and rhythm without murmur  Abdomen: Soft, nontender, no masses or organomegaly  Genitalia: normal " circumcised penis  Rectal: sphincter tone normal, no mass, stool guaiac negative  Prostate: Normal size, symmetrical bilaterally, no nodules or tenderness      Assessment and Plan. The following treatment and monitoring plan is recommended:    1. Medicare annual wellness visit, subsequent  Routine anticipatory guidance. No special services needed at this time    2. Essential hypertension  Stable. Continue current medications  - COMP METABOLIC PANEL  - LIPID PANEL W/ CHOL/HDL RATIO  - metoprolol (LOPRESSOR) 25 MG Tab; Take 1 Tab by mouth 2 times a day.  Dispense: 180 Tab; Refill: 3  - valsartan-hydrochlorothiazide (DIOVAN-HCT) 320-25 MG per tablet; Take 1 Tab by mouth every day.  Dispense: 90 Tab; Refill: 3    3. Dyslipidemia  Check lipid panel.    consider different statin  - LIPID PANEL W/ CHOL/HDL RATIO    4. CESAR (obstructive sleep apnea)  Continue CPAP    5. Prediabetes  Dietary counseling done  - COMP METABOLIC PANEL  - HGB A1C WITH EAG ESTIMATION    6. Gastroesophageal reflux disease, esophagitis presence not specified  Stable. Continue dietary modifications    7. Bilateral hip pain  Consider orthopedic referral  - diclofenac EC (VOLTAREN) 75 MG Tablet Delayed Response; Take 1 Tab by mouth 2 times a day as needed (pain).  Dispense: 180 Tab; Refill: 3    8. Chronic pain of both shoulders  Stable.  - diclofenac EC (VOLTAREN) 75 MG Tablet Delayed Response; Take 1 Tab by mouth 2 times a day as needed (pain).  Dispense: 180 Tab; Refill: 3    9. Polyarthritis with positive rheumatoid factor (CMS-HCC)  Patient has not seen rheumatology  - diclofenac EC (VOLTAREN) 75 MG Tablet Delayed Response; Take 1 Tab by mouth 2 times a day as needed (pain).  Dispense: 180 Tab; Refill: 3    10. Screening for colon cancer    - OCCULT BLOOD FECES IMMUNOASSAY (FIT); Future    11. Myalgia    - diclofenac EC (VOLTAREN) 75 MG Tablet Delayed Response; Take 1 Tab by mouth 2 times a day as needed (pain).  Dispense: 180 Tab; Refill:  3        Services suggested: No services needed at this time  Health Care Screening: Age-appropriate preventive services Medicare covers discussed today and ordered if indicated.  Referrals offered: Community-based lifestyle interventions to reduce health risks and promote self-management and wellness, fall prevention, nutrition, physical activity, tobacco-use cessation, weight loss, and mental health services as per orders if indicated.    Discussion today about general wellness and lifestyle habits:    · Prevent falls and reduce trip hazards; Cautioned about securing or removing rugs.  · Have a working fire alarm and carbon monoxide detector;   · Engage in regular physical activity and social activities       Follow-up: Return in about 6 months (around 8/20/2018).

## 2018-03-09 LAB
ALBUMIN SERPL-MCNC: 4.3 G/DL (ref 3.6–4.8)
ALBUMIN/GLOB SERPL: 1.7 {RATIO} (ref 1.2–2.2)
ALP SERPL-CCNC: 74 IU/L (ref 39–117)
ALT SERPL-CCNC: 36 IU/L (ref 0–44)
AST SERPL-CCNC: 28 IU/L (ref 0–40)
BILIRUB SERPL-MCNC: 0.9 MG/DL (ref 0–1.2)
BUN SERPL-MCNC: 25 MG/DL (ref 8–27)
BUN/CREAT SERPL: 35 (ref 10–24)
CALCIUM SERPL-MCNC: 8.9 MG/DL (ref 8.6–10.2)
CHLORIDE SERPL-SCNC: 102 MMOL/L (ref 96–106)
CHOLEST SERPL-MCNC: 146 MG/DL (ref 100–199)
CHOLEST/HDLC SERPL: 4.4 RATIO UNITS (ref 0–5)
CO2 SERPL-SCNC: 23 MMOL/L (ref 18–29)
CREAT SERPL-MCNC: 0.71 MG/DL (ref 0.76–1.27)
EST. AVERAGE GLUCOSE BLD GHB EST-MCNC: 123 MG/DL
GFR SERPLBLD CREATININE-BSD FMLA CKD-EPI: 111 ML/MIN/1.73
GFR SERPLBLD CREATININE-BSD FMLA CKD-EPI: 96 ML/MIN/1.73
GLOBULIN SER CALC-MCNC: 2.6 G/DL (ref 1.5–4.5)
GLUCOSE SERPL-MCNC: 111 MG/DL (ref 65–99)
HBA1C MFR BLD: 5.9 % (ref 4.8–5.6)
HDLC SERPL-MCNC: 33 MG/DL
LABORATORY COMMENT REPORT: ABNORMAL
LDLC SERPL CALC-MCNC: 86 MG/DL (ref 0–99)
POTASSIUM SERPL-SCNC: 4.4 MMOL/L (ref 3.5–5.2)
PROT SERPL-MCNC: 6.9 G/DL (ref 6–8.5)
SODIUM SERPL-SCNC: 141 MMOL/L (ref 134–144)
TRIGL SERPL-MCNC: 137 MG/DL (ref 0–149)
VLDLC SERPL CALC-MCNC: 27 MG/DL (ref 5–40)

## 2018-07-31 ENCOUNTER — TELEPHONE (OUTPATIENT)
Dept: MEDICAL GROUP | Facility: MEDICAL CENTER | Age: 68
End: 2018-07-31

## 2018-07-31 RX ORDER — LOSARTAN POTASSIUM AND HYDROCHLOROTHIAZIDE 25; 100 MG/1; MG/1
1 TABLET ORAL DAILY
Qty: 90 TAB | Refills: 1 | Status: SHIPPED | OUTPATIENT
Start: 2018-07-31 | End: 2018-12-07 | Stop reason: SDUPTHER

## 2018-08-15 ENCOUNTER — PATIENT MESSAGE (OUTPATIENT)
Dept: MEDICAL GROUP | Facility: MEDICAL CENTER | Age: 68
End: 2018-08-15

## 2018-08-15 RX ORDER — AZITHROMYCIN 250 MG/1
TABLET, FILM COATED ORAL
Qty: 6 TAB | Refills: 0 | Status: SHIPPED | OUTPATIENT
Start: 2018-08-15 | End: 2018-08-16 | Stop reason: SDUPTHER

## 2018-08-15 NOTE — TELEPHONE ENCOUNTER
From: Bolivar Sykes  To: Nona Mcmahan M.D.  Sent: 8/15/2018 11:12 AM PDT  Subject: Prescription Question     Syed Castellano, For the past 3 weeks I have been feeling light headed again like i did when I had the sinus infection. I started taking the new blood pressure medication that you prescribed for at least a week now. I checked my blood pressure at the [facility] in [place] and it was 128/86 which I believe is acceptable. I was wondering if you could prescribe the antibiotic that you gave me before for the sinus infection. If you prescribe an antibiotic can you send the prescription to the [facility]. Thank you Bolivar Sykes

## 2018-08-16 ENCOUNTER — PATIENT MESSAGE (OUTPATIENT)
Dept: MEDICAL GROUP | Facility: MEDICAL CENTER | Age: 68
End: 2018-08-16

## 2018-08-16 RX ORDER — AZITHROMYCIN 250 MG/1
TABLET, FILM COATED ORAL
Qty: 6 TAB | Refills: 0 | Status: SHIPPED | OUTPATIENT
Start: 2018-08-16 | End: 2018-08-21

## 2018-09-06 ENCOUNTER — OFFICE VISIT (OUTPATIENT)
Dept: MEDICAL GROUP | Facility: MEDICAL CENTER | Age: 68
End: 2018-09-06
Payer: MEDICARE

## 2018-09-06 VITALS
OXYGEN SATURATION: 97 % | BODY MASS INDEX: 29.59 KG/M2 | DIASTOLIC BLOOD PRESSURE: 82 MMHG | TEMPERATURE: 98 F | SYSTOLIC BLOOD PRESSURE: 132 MMHG | WEIGHT: 243 LBS | HEIGHT: 76 IN | HEART RATE: 53 BPM | RESPIRATION RATE: 20 BRPM

## 2018-09-06 DIAGNOSIS — I10 ESSENTIAL HYPERTENSION: ICD-10-CM

## 2018-09-06 DIAGNOSIS — M05.80 POLYARTHRITIS WITH POSITIVE RHEUMATOID FACTOR (HCC): ICD-10-CM

## 2018-09-06 DIAGNOSIS — H81.13 BENIGN PAROXYSMAL POSITIONAL VERTIGO DUE TO BILATERAL VESTIBULAR DISORDER: ICD-10-CM

## 2018-09-06 PROCEDURE — 99214 OFFICE O/P EST MOD 30 MIN: CPT | Performed by: FAMILY MEDICINE

## 2018-09-06 NOTE — ASSESSMENT & PLAN NOTE
Stable. Currently taking losartan-HCTZ 100-25 mg and lopressor 25 mg as directed.   He is taking baby aspirin daily.   He is monitoring BP at home.   Denies symptoms low BP: light-headed, tunnel-vision, unusual fatigue. Still has some dizziness with standing and turning his head  Denies symptoms high BP:pounding headache, visual changes, palpitations, flushed face.   Denies medicine side effects: unusual fatigue, slow heartbeat, foot/leg swelling, cough.

## 2018-09-06 NOTE — PATIENT INSTRUCTIONS
Epley Maneuver Self-Care  WHAT IS THE EPLEY MANEUVER?  The Epley maneuver is an exercise you can do to relieve symptoms of benign paroxysmal positional vertigo (BPPV). This condition is often just referred to as vertigo. BPPV is caused by the movement of tiny crystals (canaliths) inside your inner ear. The accumulation and movement of canaliths in your inner ear causes a sudden spinning sensation (vertigo) when you move your head to certain positions. Vertigo usually lasts about 30 seconds. BPPV usually occurs in just one ear. If you get vertigo when you lie on your left side, you probably have BPPV in your left ear. Your health care provider can tell you which ear is involved.   BPPV may be caused by a head injury. Many people older than 50 get BPPV for unknown reasons. If you have been diagnosed with BPPV, your health care provider may teach you how to do this maneuver. BPPV is not life threatening (benign) and usually goes away in time.   WHEN SHOULD I PERFORM THE EPLEY MANEUVER?  You can do this maneuver at home whenever you have symptoms of vertigo. You may do the Epley maneuver up to 3 times a day until your symptoms of vertigo go away.  HOW SHOULD I DO THE EPLEY MANEUVER?  1. Sit on the edge of a bed or table with your back straight. Your legs should be extended or hanging over the edge of the bed or table.    2. Turn your head care home toward the affected ear.    3. Lie backward quickly with your head turned until you are lying flat on your back. You may want to position a pillow under your shoulders.    4. Hold this position for 30 seconds. You may experience an attack of vertigo. This is normal. Hold this position until the vertigo stops.  5. Then turn your head to the opposite direction until your unaffected ear is facing the floor.    6. Hold this position for 30 seconds. You may experience an attack of vertigo. This is normal. Hold this position until the vertigo stops.  7. Now turn your whole body to  the same side as your head. Hold for another 30 seconds.    8. You can then sit back up.  ARE THERE RISKS TO THIS MANEUVER?  In some cases, you may have other symptoms (such as changes in your vision, weakness, or numbness). If you have these symptoms, stop doing the maneuver and call your health care provider. Even if doing these maneuvers relieves your vertigo, you may still have dizziness. Dizziness is the sensation of light-headedness but without the sensation of movement. Even though the Epley maneuver may relieve your vertigo, it is possible that your symptoms will return within 5 years.  WHAT SHOULD I DO AFTER THIS MANEUVER?  After doing the Epley maneuver, you can return to your normal activities. Ask your doctor if there is anything you should do at home to prevent vertigo. This may include:  · Sleeping with two or more pillows to keep your head elevated.  · Not sleeping on the side of your affected ear.  · Getting up slowly from bed.  · Avoiding sudden movements during the day.  · Avoiding extreme head movement, like looking up or bending over.  · Wearing a cervical collar to prevent sudden head movements.  WHAT SHOULD I DO IF MY SYMPTOMS GET WORSE?  Call your health care provider if your vertigo gets worse. Call your provider right way if you have other symptoms, including:   · Nausea.  · Vomiting.  · Headache.  · Weakness.  · Numbness.  · Vision changes.     This information is not intended to replace advice given to you by your health care provider. Make sure you discuss any questions you have with your health care provider.     Document Released: 12/23/2014 Document Reviewed: 12/23/2014  [employer] Patient Education ©2016 [employer]

## 2018-09-14 NOTE — ASSESSMENT & PLAN NOTE
Complains of dizziness described as a spinning sensation, inability to maintain balance  Occurs with  rapid head movements, rolling over in bed, rising from a supine position  Denies dimming vision, visual floaters, speech change otalgia, otorrhea, tinnitus, hearing loss

## 2018-09-14 NOTE — ASSESSMENT & PLAN NOTE
Patient has not followed up with rheumatology.  He has been managing the joint pain with Voltaren.  He is going to call the rheumatologist to schedule an appointment.

## 2018-09-14 NOTE — PROGRESS NOTES
Subjective:     Chief Complaint   Patient presents with   • Follow-Up     blood pressure       Bolivar MANOLO Sykes is a 68 y.o. male here today for evaluation and management of:    Essential hypertension  Stable. Currently taking losartan-HCTZ 100-25 mg and lopressor 25 mg as directed.   He is taking baby aspirin daily.   He is monitoring BP at home.   Denies symptoms low BP: light-headed, tunnel-vision, unusual fatigue. Still has some dizziness with standing and turning his head  Denies symptoms high BP:pounding headache, visual changes, palpitations, flushed face.   Denies medicine side effects: unusual fatigue, slow heartbeat, foot/leg swelling, cough.      Polyarthritis with positive rheumatoid factor (CMS-HCC) ([facility])  Patient has not followed up with rheumatology.  He has been managing the joint pain with Voltaren.  He is going to call the rheumatologist to schedule an appointment.    Benign paroxysmal positional vertigo due to bilateral vestibular disorder  Complains of dizziness described as a spinning sensation, inability to maintain balance  Occurs with  rapid head movements, rolling over in bed, rising from a supine position  Denies dimming vision, visual floaters, speech change otalgia, otorrhea, tinnitus, hearing loss            Allergies   Allergen Reactions   • Morphine        Current medicines (including changes today)  Current Outpatient Prescriptions   Medication Sig Dispense Refill   • losartan-hydrochlorothiazide (HYZAAR) 100-25 MG per tablet Take 1 Tab by mouth every day. 90 Tab 1   • metoprolol (LOPRESSOR) 25 MG Tab Take 1 Tab by mouth 2 times a day. 180 Tab 3   • atorvastatin (LIPITOR) 40 MG Tab Take 1 Tab by mouth every day. 90 Tab 3   • diclofenac EC (VOLTAREN) 75 MG Tablet Delayed Response Take 1 Tab by mouth 2 times a day as needed (pain). 180 Tab 3   • aspirin (ECOTRIN LOW STRENGTH) 81 MG EC tablet Take 81 mg by mouth.     • Cholecalciferol (PA VITAMIN D-3) 2000 UNIT Cap Take 2,000 Units by  "mouth.       No current facility-administered medications for this visit.        He  has a past medical history of GERD (gastroesophageal reflux disease) (2/15/2017); Hyperlipidemia; Hypertension; Prediabetes (2/15/2017); Sleep apnea; and Tear of lateral cartilage or meniscus of knee, current (7/25/2012).    Patient Active Problem List    Diagnosis Date Noted   • Essential hypertension 08/20/2013     Priority: High   • Dyslipidemia 08/20/2013     Priority: High   • CESAR (obstructive sleep apnea) 08/20/2013     Priority: High   • Family history of premature CAD 08/20/2013     Priority: High   • Tear of lateral cartilage or meniscus of knee, current 07/25/2012     Priority: Low   • Benign paroxysmal positional vertigo due to bilateral vestibular disorder 09/06/2018   • Polyarthritis with positive rheumatoid factor (HCC) 10/25/2017   • Chronic pain of both shoulders 10/24/2017   • Bilateral hip pain 10/24/2017   • Prediabetes 02/15/2017   • GERD (gastroesophageal reflux disease) 02/15/2017   • Esophageal stricture 02/15/2017       ROS   No fever or chills.  No nausea or vomiting.  No chest pain or palpitations.  No cough or SOB.  No pain with urination or hematuria.  No black or bloody stools.       Objective:     Blood pressure 132/82, pulse (!) 53, temperature 36.7 °C (98 °F), resp. rate 20, height 1.93 m (6' 4\"), weight 110.2 kg (243 lb), SpO2 97 %. Body mass index is 29.58 kg/m².   Physical Exam:  Well developed, well nourished.  Alert, oriented in no acute distress.  Eye contact is good, speech goal directed, affect calm  Eyes: conjunctiva non-injected, sclera non-icteric.  PERRL.  EOMs intact  Ears: Pinna normal. TM pearly gray.   Nose: Nares are patent.  Normal mucosa  Mouth: Oral mucous membranes pink and moist with no lesions.  Neck Supple.  No adenopathy or masses in the neck or supraclavicular regions. No thyromegaly  Lungs: clear to auscultation bilaterally with good excursion. No wheezes or rhonchi  CV: " regular rate and rhythm. No murmur  Neurologic: Cranial nerves II through XII are grossly intact.  Normal gait.  Negative Romberg      Assessment and Plan:   The following treatment plan was discussed    1. Essential hypertension  This is a chronic medical condition that is currently stable  Continue current medications    2. Benign paroxysmal positional vertigo due to bilateral vestibular disorder  Refer to physical therapy for vestibular therapy.  Patient education material given  - REFERRAL TO PHYSICAL THERAPY Reason for Therapy: Eval/Treat/Report    3. Polyarthritis with positive rheumatoid factor (HCC)  Stressed the importance of follow-up with rheumatology.    Any change or worsening of signs or symptoms, patient encouraged to follow-up or report to the emergency room for further evaluation. Patient understands and agrees.    Followup: Return in about 6 months (around 3/6/2019).

## 2018-11-29 ENCOUNTER — TELEPHONE (OUTPATIENT)
Dept: MEDICAL GROUP | Facility: LAB | Age: 68
End: 2018-11-29

## 2018-11-29 NOTE — TELEPHONE ENCOUNTER
I signed the order.  Please call [facility] and make sure they have out new address and fax.  Nona Mcmahan M.D.

## 2018-11-29 NOTE — TELEPHONE ENCOUNTER
Edith Georges/ Therapy services called and LM several times. Calling again today  [phone]  Stating they have sent for signature 4 times for pt to continue treatment. They had to cancel pt's treatment until they receive paperwork back signed.   This is scanned in chart and placed at providers desk.

## 2018-12-11 RX ORDER — LOSARTAN POTASSIUM AND HYDROCHLOROTHIAZIDE 25; 100 MG/1; MG/1
TABLET ORAL
Qty: 90 TAB | Refills: 2 | Status: SHIPPED | OUTPATIENT
Start: 2018-12-11 | End: 2019-02-13 | Stop reason: SDUPTHER

## 2019-02-13 ENCOUNTER — OFFICE VISIT (OUTPATIENT)
Dept: MEDICAL GROUP | Facility: LAB | Age: 69
End: 2019-02-13
Payer: MEDICARE

## 2019-02-13 VITALS
BODY MASS INDEX: 29.01 KG/M2 | HEIGHT: 76 IN | OXYGEN SATURATION: 94 % | DIASTOLIC BLOOD PRESSURE: 68 MMHG | RESPIRATION RATE: 14 BRPM | SYSTOLIC BLOOD PRESSURE: 122 MMHG | HEART RATE: 67 BPM | TEMPERATURE: 98.2 F | WEIGHT: 238.2 LBS

## 2019-02-13 DIAGNOSIS — R73.03 PREDIABETES: ICD-10-CM

## 2019-02-13 DIAGNOSIS — Z00.00 MEDICARE ANNUAL WELLNESS VISIT, SUBSEQUENT: ICD-10-CM

## 2019-02-13 DIAGNOSIS — Z12.11 SCREENING FOR COLON CANCER: ICD-10-CM

## 2019-02-13 DIAGNOSIS — K22.2 ESOPHAGEAL STRICTURE: ICD-10-CM

## 2019-02-13 DIAGNOSIS — M75.121 COMPLETE TEAR OF RIGHT ROTATOR CUFF: ICD-10-CM

## 2019-02-13 DIAGNOSIS — Z12.5 SCREENING FOR PROSTATE CANCER: ICD-10-CM

## 2019-02-13 DIAGNOSIS — M05.80 POLYARTHRITIS WITH POSITIVE RHEUMATOID FACTOR (HCC): ICD-10-CM

## 2019-02-13 DIAGNOSIS — I10 ESSENTIAL HYPERTENSION: ICD-10-CM

## 2019-02-13 DIAGNOSIS — Z82.49 FAMILY HISTORY OF PREMATURE CAD: ICD-10-CM

## 2019-02-13 DIAGNOSIS — E78.5 DYSLIPIDEMIA: ICD-10-CM

## 2019-02-13 DIAGNOSIS — H53.2 DOUBLE VISION WITH BOTH EYES OPEN: ICD-10-CM

## 2019-02-13 DIAGNOSIS — G47.33 OSA (OBSTRUCTIVE SLEEP APNEA): ICD-10-CM

## 2019-02-13 DIAGNOSIS — K21.9 GASTROESOPHAGEAL REFLUX DISEASE, ESOPHAGITIS PRESENCE NOT SPECIFIED: ICD-10-CM

## 2019-02-13 PROCEDURE — G0439 PPPS, SUBSEQ VISIT: HCPCS | Performed by: FAMILY MEDICINE

## 2019-02-13 RX ORDER — ATORVASTATIN CALCIUM 40 MG/1
40 TABLET, FILM COATED ORAL DAILY
Qty: 90 TAB | Refills: 3 | Status: SHIPPED | OUTPATIENT
Start: 2019-02-13 | End: 2020-03-29

## 2019-02-13 RX ORDER — LOSARTAN POTASSIUM AND HYDROCHLOROTHIAZIDE 25; 100 MG/1; MG/1
1 TABLET ORAL
Qty: 90 TAB | Refills: 3 | Status: SHIPPED | OUTPATIENT
Start: 2019-02-13 | End: 2020-03-30

## 2019-02-13 RX ORDER — DICLOFENAC SODIUM 75 MG/1
75 TABLET, DELAYED RELEASE ORAL 2 TIMES DAILY PRN
Qty: 180 TAB | Refills: 3 | Status: SHIPPED | OUTPATIENT
Start: 2019-02-13 | End: 2020-01-29

## 2019-02-13 ASSESSMENT — ENCOUNTER SYMPTOMS: GENERAL WELL-BEING: EXCELLENT

## 2019-02-13 ASSESSMENT — ACTIVITIES OF DAILY LIVING (ADL): BATHING_REQUIRES_ASSISTANCE: 0

## 2019-02-13 ASSESSMENT — PATIENT HEALTH QUESTIONNAIRE - PHQ9: CLINICAL INTERPRETATION OF PHQ2 SCORE: 0

## 2019-02-13 NOTE — PROGRESS NOTES
Chief Complaint   Patient presents with   • Annual Exam         HPI:  Bolivar is a 68 y.o. here for Medicare Annual Wellness Visit          Patient Active Problem List    Diagnosis Date Noted   • Essential hypertension 08/20/2013     Priority: High   • Dyslipidemia 08/20/2013     Priority: High   • CESAR (obstructive sleep apnea) 08/20/2013     Priority: High   • Family history of premature CAD 08/20/2013     Priority: High   • Tear of lateral cartilage or meniscus of knee, current 07/25/2012     Priority: Low   • Complete tear of right rotator cuff 02/13/2019   • Double vision with both eyes open 02/13/2019   • Benign paroxysmal positional vertigo due to bilateral vestibular disorder 09/06/2018   • Polyarthritis with positive rheumatoid factor (HCC) 10/25/2017   • Chronic pain of both shoulders 10/24/2017   • Bilateral hip pain 10/24/2017   • Prediabetes 02/15/2017   • GERD (gastroesophageal reflux disease) 02/15/2017   • Esophageal stricture 02/15/2017       Current Outpatient Prescriptions   Medication Sig Dispense Refill   • diclofenac EC (VOLTAREN) 75 MG Tablet Delayed Response Take 1 Tab by mouth 2 times a day as needed (pain). 180 Tab 3   • metoprolol (LOPRESSOR) 25 MG Tab Take 1 Tab by mouth 2 times a day. 180 Tab 3   • atorvastatin (LIPITOR) 40 MG Tab Take 1 Tab by mouth every day. 90 Tab 3   • losartan-hydrochlorothiazide (HYZAAR) 100-25 MG per tablet Take 1 Tab by mouth every day. 90 Tab 3   • aspirin (ECOTRIN LOW STRENGTH) 81 MG EC tablet Take 81 mg by mouth.     • Cholecalciferol (PA VITAMIN D-3) 2000 UNIT Cap Take 2,000 Units by mouth.       No current facility-administered medications for this visit.         Patient is taking medications as noted in medication list.  Current supplements as per medication list.     Allergies: Morphine    Current social contact/activities: yes, golf, work     Is patient current with immunizations? Yes.    He  reports that he has never smoked. He has never used smokeless  tobacco. He reports that he drinks about 8.4 oz of alcohol per week . He reports that he does not use drugs.  Counseling given: Not Answered        DPA/Advanced directive: Patient does not have an Advanced Directive.  A packet and workshop information was given on Advanced Directives.    ROS:    Gait: Uses no assistive device   Ostomy: No   Other tubes: No   Amputations: No   Chronic oxygen use No   Last eye exam : October 2018 at Dr. Mcmahan in [place], [place]   Wears hearing aids: No   : Denies any urinary leakage during the last 6 months      Screening:        Depression Screening    Little interest or pleasure in doing things?  0 - not at all  Feeling down, depressed, or hopeless? 0 - not at all  Patient Health Questionnaire Score: 0    If depressive symptoms identified deferred to follow up visit unless specifically addressed in assessment and plan.    Interpretation of PHQ-9 Total Score   Score Severity   1-4 No Depression   5-9 Mild Depression   10-14 Moderate Depression   15-19 Moderately Severe Depression   20-27 Severe Depression    Screening for Cognitive Impairment    Three Minute Recall (leader, season, table)  1/3    Tay clock face with all 12 numbers and set the hands to show 10 past 11.  Yes    If cognitive concerns identified, deferred for follow up unless specifically addressed in assessment and plan.    Fall Risk Assessment    Has the patient had two or more falls in the last year or any fall with injury in the last year?  No  If fall risk identified, deferred for follow up unless specifically addressed in assessment and plan.    Safety Assessment    Throw rugs on floor.  Yes  Handrails on all stairs.  Yes  Good lighting in all hallways.  Yes  Difficulty hearing.  No  Patient counseled about all safety risks that were identified.    Functional Assessment ADLs    Are there any barriers preventing you from cooking for yourself or meeting nutritional needs?  No.    Are there any barriers  preventing you from driving safely or obtaining transportation?  No.    Are there any barriers preventing you from using a telephone or calling for help?  No.    Are there any barriers preventing you from shopping?  No.    Are there any barriers preventing you from taking care of your own finances?  No.    Are there any barriers preventing you from managing your medications?  No.    Are there any barriers preventing you from showering, bathing or dressing yourself?  No.    Are you currently engaging in any exercise or physical activity?  Yes.     What is your perception of your health?  Excellent.    Health Maintenance Summary                COLON CANCER SCREENING ANNUAL FIT Overdue 7/8/1999     IMM ZOSTER VACCINES Postponed 2/13/2020 Originally 7/8/1999. System: vaccine not available, other system reasons    Annual Wellness Visit Next Due 2/14/2020      Done 2/13/2019 Visit Dx: Medicare annual wellness visit, subsequent     Patient has more history with this topic...    IMM DTaP/Tdap/Td Vaccine Next Due 2/15/2027      Done 2/15/2017 Imm Admin: Tdap Vaccine          Patient Care Team:  Nona Mcmahan M.D. as PCP - General (Family Medicine)    Social History   Substance Use Topics   • Smoking status: Never Smoker   • Smokeless tobacco: Never Used   • Alcohol use 8.4 oz/week     14 Cans of beer per week     Family History   Problem Relation Age of Onset   • Heart Attack Brother    • Hypertension Brother    • Stroke Brother    • Arthritis Mother    • Heart Disease Mother    • Hypertension Mother    • Heart Disease Father    • Hypertension Father      He  has a past medical history of GERD (gastroesophageal reflux disease) (2/15/2017); Hyperlipidemia; Hypertension; Prediabetes (2/15/2017); Sleep apnea; and Tear of lateral cartilage or meniscus of knee, current (7/25/2012).   Past Surgical History:   Procedure Laterality Date   • APPENDECTOMY     • HERNIA REPAIR     • KNEE ARTHROSCOPY Bilateral     4 total  "          Exam:     Blood pressure 122/68, pulse 67, temperature 36.8 °C (98.2 °F), temperature source Temporal, resp. rate 14, height 1.93 m (6' 4\"), weight 108 kg (238 lb 3.2 oz), SpO2 94 %. Body mass index is 28.99 kg/m².    Hearing fair.    Dentition fair  Alert, oriented in no acute distress.  Eye contact is good, speech goal directed, affect calm  Constitutional: Alert, no distress.  Skin: Warm, dry, good turgor, no rashes in visible areas.  Eye: Equal, round and reactive, conjunctiva clear, lids normal.  ENMT: Lips without lesions, good dentition, oropharynx clear.  Neck: Trachea midline, no masses, no thyromegaly. No cervical or supraclavicular lymphadenopathy  Respiratory: Unlabored respiratory effort, lungs clear to auscultation, no wheezes, no ronchi.  Cardiovascular: Normal S1, S2, RRR, no murmur, no edema.  Abdomen: Soft, non-tender, no masses, no hepatosplenomegaly.  Psych: Alert and oriented x3, normal affect and mood.        Assessment and Plan. The following treatment and monitoring plan is recommended:    1. Medicare annual wellness visit, subsequent  Routine anticipatory guidance.  Okay for activity of choice    2. Dyslipidemia  Check lipid panel  - Lipid Profile; Future  - REFERRAL TO CARDIOLOGY    3. Essential hypertension  Continue metoprolol  - metoprolol (LOPRESSOR) 25 MG Tab; Take 1 Tab by mouth 2 times a day.  Dispense: 180 Tab; Refill: 3  - Comp Metabolic Panel; Future  - REFERRAL TO CARDIOLOGY    4. Esophageal stricture  Follow up with GI as scheduled    5. Gastroesophageal reflux disease, esophagitis presence not specified  Follow up with GI as scheduled.  Dietary counseling done  - CBC WITH DIFFERENTIAL; Future    6. CESAR (obstructive sleep apnea)  Continue CPAP    7. Polyarthritis with positive rheumatoid factor (HCC)  Patient did see rheumatology  - diclofenac EC (VOLTAREN) 75 MG Tablet Delayed Response; Take 1 Tab by mouth 2 times a day as needed (pain).  Dispense: 180 Tab; Refill: " 3  - CBC WITH DIFFERENTIAL; Future    8. Prediabetes  Dietary counseling done  - HEMOGLOBIN A1C; Future  - Comp Metabolic Panel; Future    9. Complete tear of right rotator cuff  Continue physical therapy exercises.  Patient does not want to do surgery at this time    10. Screening for prostate cancer  Screening labs ordered.  Await results for counseling.    - PROSTATE SPECIFIC AG SCREENING; Future    11. Double vision with both eyes open  Ultrasound of the carotid arteries to assess  - US-CAROTID DOPPLER BILAT; Future    12. Family history of premature CAD  Given his other risk factors will refer to cardiology for evaluation and treatment  - REFERRAL TO CARDIOLOGY    13. Screening for colon cancer    - COLOGUARD (FIT DNA)        Services suggested: No services needed at this time  Health Care Screening recommendations as per orders if indicated.  Referrals offered: PT/OT/Nutrition counseling/Behavioral Health/Smoking cessation as per orders if indicated.    Discussion today about general wellness and lifestyle habits:    · Prevent falls and reduce trip hazards; Cautioned about securing or removing rugs.  · Have a working fire alarm and carbon monoxide detector;   · Engage in regular physical activity and social activities       Follow-up: Return in about 1 year (around 2/13/2020).

## 2019-03-20 LAB
ALBUMIN SERPL-MCNC: 4.4 G/DL (ref 3.6–4.8)
ALBUMIN/GLOB SERPL: 1.6 {RATIO} (ref 1.2–2.2)
ALP SERPL-CCNC: 73 IU/L (ref 39–117)
ALT SERPL-CCNC: 46 IU/L (ref 0–44)
AST SERPL-CCNC: 32 IU/L (ref 0–40)
BASOPHILS # BLD AUTO: 0 X10E3/UL (ref 0–0.2)
BASOPHILS NFR BLD AUTO: 1 %
BILIRUB SERPL-MCNC: 0.8 MG/DL (ref 0–1.2)
BUN SERPL-MCNC: 20 MG/DL (ref 8–27)
BUN/CREAT SERPL: 22 (ref 10–24)
CALCIUM SERPL-MCNC: 9.1 MG/DL (ref 8.6–10.2)
CHLORIDE SERPL-SCNC: 104 MMOL/L (ref 96–106)
CHOLEST SERPL-MCNC: 155 MG/DL (ref 100–199)
CO2 SERPL-SCNC: 25 MMOL/L (ref 20–29)
CREAT SERPL-MCNC: 0.91 MG/DL (ref 0.76–1.27)
EOSINOPHIL # BLD AUTO: 0.2 X10E3/UL (ref 0–0.4)
EOSINOPHIL NFR BLD AUTO: 3 %
ERYTHROCYTE [DISTWIDTH] IN BLOOD BY AUTOMATED COUNT: 13.1 % (ref 12.3–15.4)
GLOBULIN SER CALC-MCNC: 2.7 G/DL (ref 1.5–4.5)
GLUCOSE SERPL-MCNC: 103 MG/DL (ref 65–99)
HBA1C MFR BLD: 5.8 % (ref 4.8–5.6)
HCT VFR BLD AUTO: 45 % (ref 37.5–51)
HDLC SERPL-MCNC: 40 MG/DL
HGB BLD-MCNC: 15 G/DL (ref 13–17.7)
IMM GRANULOCYTES # BLD AUTO: 0 X10E3/UL (ref 0–0.1)
IMM GRANULOCYTES NFR BLD AUTO: 0 %
IMMATURE CELLS  115398: NORMAL
LABORATORY COMMENT REPORT: NORMAL
LDLC SERPL CALC-MCNC: 91 MG/DL (ref 0–99)
LYMPHOCYTES # BLD AUTO: 1.7 X10E3/UL (ref 0.7–3.1)
LYMPHOCYTES NFR BLD AUTO: 29 %
MCH RBC QN AUTO: 31.6 PG (ref 26.6–33)
MCHC RBC AUTO-ENTMCNC: 33.3 G/DL (ref 31.5–35.7)
MCV RBC AUTO: 95 FL (ref 79–97)
MONOCYTES # BLD AUTO: 0.7 X10E3/UL (ref 0.1–0.9)
MONOCYTES NFR BLD AUTO: 12 %
MORPHOLOGY BLD-IMP: NORMAL
NEUTROPHILS # BLD AUTO: 3.3 X10E3/UL (ref 1.4–7)
NEUTROPHILS NFR BLD AUTO: 55 %
NRBC BLD AUTO-RTO: NORMAL %
PLATELET # BLD AUTO: 242 X10E3/UL (ref 150–379)
POTASSIUM SERPL-SCNC: 4.2 MMOL/L (ref 3.5–5.2)
PROT SERPL-MCNC: 7.1 G/DL (ref 6–8.5)
PSA SERPL-MCNC: 3.3 NG/ML (ref 0–4)
RBC # BLD AUTO: 4.75 X10E6/UL (ref 4.14–5.8)
SODIUM SERPL-SCNC: 143 MMOL/L (ref 134–144)
TRIGL SERPL-MCNC: 122 MG/DL (ref 0–149)
VLDLC SERPL CALC-MCNC: 24 MG/DL (ref 5–40)
WBC # BLD AUTO: 5.9 X10E3/UL (ref 3.4–10.8)

## 2019-04-03 ENCOUNTER — HOSPITAL ENCOUNTER (OUTPATIENT)
Dept: RADIOLOGY | Facility: MEDICAL CENTER | Age: 69
End: 2019-04-03
Attending: FAMILY MEDICINE
Payer: MEDICARE

## 2019-04-03 DIAGNOSIS — H53.2 DOUBLE VISION WITH BOTH EYES OPEN: ICD-10-CM

## 2019-04-03 PROCEDURE — 93880 EXTRACRANIAL BILAT STUDY: CPT

## 2019-06-13 ENCOUNTER — TELEPHONE (OUTPATIENT)
Dept: MEDICAL GROUP | Facility: LAB | Age: 69
End: 2019-06-13

## 2019-06-13 NOTE — TELEPHONE ENCOUNTER
Returning pt's voicemail. He stated he was dizzy. He ended up going to the ED. I made him a follow up appt with his provider.

## 2019-06-25 ENCOUNTER — OFFICE VISIT (OUTPATIENT)
Dept: MEDICAL GROUP | Facility: LAB | Age: 69
End: 2019-06-25
Payer: MEDICARE

## 2019-06-25 VITALS
TEMPERATURE: 98.5 F | BODY MASS INDEX: 29.74 KG/M2 | SYSTOLIC BLOOD PRESSURE: 148 MMHG | HEART RATE: 63 BPM | OXYGEN SATURATION: 94 % | HEIGHT: 76 IN | WEIGHT: 244.2 LBS | DIASTOLIC BLOOD PRESSURE: 86 MMHG

## 2019-06-25 DIAGNOSIS — R42 LIGHTHEADED: ICD-10-CM

## 2019-06-25 DIAGNOSIS — I10 ESSENTIAL HYPERTENSION: ICD-10-CM

## 2019-06-25 DIAGNOSIS — R55 NEAR SYNCOPE: ICD-10-CM

## 2019-06-25 PROCEDURE — 99214 OFFICE O/P EST MOD 30 MIN: CPT | Performed by: FAMILY MEDICINE

## 2019-06-25 NOTE — ASSESSMENT & PLAN NOTE
Patient was seen in [facility] ER 6/13/19 for near syncope and dizziness.  He had a negative cardiac workup.  Prior to this, he had a normal MRI of the brain on 5/7/19.  Negative stress exercise test.    MIld carotid artery disease on carotid ultrasound 4/19.  He had been doing physical therapy for balance but stopped doing the exercises when he felt better.  He had been out cutting wood and had not been drinking much fluids.  He had been drinking a lot of diet pepsi but has switched to water since then.

## 2019-06-25 NOTE — PROGRESS NOTES
Subjective:     Chief Complaint   Patient presents with   • Follow-Up     hospital        Bolivar Sykes is a 68 y.o. male here today for evaluation and management of:    Near syncope  Patient was seen in [facility] ER 6/13/19 for near syncope and dizziness.  He had a negative cardiac workup.  Prior to this, he had a normal MRI of the brain on 5/7/19.  Negative stress exercise test.    MIld carotid artery disease on carotid ultrasound 4/19.  He had been doing physical therapy for balance but stopped doing the exercises when he felt better.  He had been out cutting wood and had not been drinking much fluids.  He had been drinking a lot of diet pepsi but has switched to water since then.      Essential hypertension  Stable. Currently taking metoprolol and losartan-HCTZ as directed.   He is taking baby aspirin daily.   He is monitoring BP at home.   Denies symptoms low BP:  tunnel-vision, unusual fatigue.   Does complain of lightheadedness.  Denies symptoms high BP:pounding headache, visual changes, palpitations, flushed face.   Denies medicine side effects: unusual fatigue, slow heartbeat, foot/leg swelling, cough.         Allergies   Allergen Reactions   • Morphine        Current medicines (including changes today)  Current Outpatient Prescriptions   Medication Sig Dispense Refill   • Misc Natural Products (FIBER 7 PO) Take  by mouth.     • diclofenac EC (VOLTAREN) 75 MG Tablet Delayed Response Take 1 Tab by mouth 2 times a day as needed (pain). 180 Tab 3   • metoprolol (LOPRESSOR) 25 MG Tab Take 1 Tab by mouth 2 times a day. 180 Tab 3   • atorvastatin (LIPITOR) 40 MG Tab Take 1 Tab by mouth every day. 90 Tab 3   • losartan-hydrochlorothiazide (HYZAAR) 100-25 MG per tablet Take 1 Tab by mouth every day. 90 Tab 3   • aspirin (ECOTRIN LOW STRENGTH) 81 MG EC tablet Take 81 mg by mouth.     • Cholecalciferol (PA VITAMIN D-3) 2000 UNIT Cap Take 2,000 Units by mouth.       No current facility-administered medications for  "this visit.        He  has a past medical history of GERD (gastroesophageal reflux disease) (2/15/2017); Hyperlipidemia; Hypertension; Prediabetes (2/15/2017); Sleep apnea; and Tear of lateral cartilage or meniscus of knee, current (7/25/2012).    Patient Active Problem List    Diagnosis Date Noted   • Essential hypertension 08/20/2013     Priority: High   • Dyslipidemia 08/20/2013     Priority: High   • CESAR (obstructive sleep apnea) 08/20/2013     Priority: High   • Family history of premature CAD 08/20/2013     Priority: High   • Tear of lateral cartilage or meniscus of knee, current 07/25/2012     Priority: Low   • Near syncope 06/25/2019   • Lightheaded 06/25/2019   • Complete tear of right rotator cuff 02/13/2019   • Double vision with both eyes open 02/13/2019   • Benign paroxysmal positional vertigo due to bilateral vestibular disorder 09/06/2018   • Polyarthritis with positive rheumatoid factor (HCC) 10/25/2017   • Chronic pain of both shoulders 10/24/2017   • Bilateral hip pain 10/24/2017   • Prediabetes 02/15/2017   • GERD (gastroesophageal reflux disease) 02/15/2017   • Esophageal stricture 02/15/2017       ROS   No fever or chills.  No nausea or vomiting.  No chest pain or palpitations.  No cough or SOB.  No pain with urination or hematuria.  No black or bloody stools.       Objective:     /86 (BP Location: Left arm, Patient Position: Sitting)   Pulse 63   Temp 36.9 °C (98.5 °F) (Temporal)   Ht 1.93 m (6' 4\")   Wt 110.8 kg (244 lb 3.2 oz)   SpO2 94%  Body mass index is 29.72 kg/m².   Physical Exam:  Well developed, well nourished.  Alert, oriented in no acute distress.  Eye contact is good, speech goal directed, affect calm  Eyes: conjunctiva non-injected, sclera non-icteric.PERRL.  EOMs intact  Ears: Pinna normal. TM pearly gray.   Nose: Nares are patent.  Normal mucosa  Mouth: Oral mucous membranes pink and moist with no lesions.  Neck Supple.  No adenopathy or masses in the neck or " supraclavicular regions. No thyromegaly  Lungs: clear to auscultation bilaterally with good excursion. No wheezes or rhonchi  CV: regular rate and rhythm. No murmur            Assessment and Plan:   The following treatment plan was discussed    1. Near syncope  Patient has had negative work-up.  The only other thing would be to have him see an ENT which she does not want to do at this time.  We discussed drinking enough fluids, especially water.  Restart doing balance exercises.    2. Lightheaded  Patient has had negative work-up.  The only other thing would be to have him see an ENT which she does not want to do at this time.  We discussed drinking enough fluids, especially water.  Restart doing balance exercises.    3. Essential hypertension  Continue current medications.  Discussed with patient that I would like him to monitor his blood pressure.  Consider adding in amlodipine 5 mg daily.  Patients can be seen his cardiologist in the next week and will discuss this with him.    Any change or worsening of signs or symptoms, patient encouraged to follow-up or report to the emergency room for further evaluation. Patient understands and agrees.    Followup: Return in about 3 months (around 9/25/2019).

## 2019-06-25 NOTE — ASSESSMENT & PLAN NOTE
Stable. Currently taking metoprolol and losartan-HCTZ as directed.   He is taking baby aspirin daily.   He is monitoring BP at home.   Denies symptoms low BP:  tunnel-vision, unusual fatigue.   Does complain of lightheadedness.  Denies symptoms high BP:pounding headache, visual changes, palpitations, flushed face.   Denies medicine side effects: unusual fatigue, slow heartbeat, foot/leg swelling, cough.

## 2019-12-12 ENCOUNTER — PATIENT MESSAGE (OUTPATIENT)
Dept: MEDICAL GROUP | Facility: LAB | Age: 69
End: 2019-12-12

## 2019-12-12 DIAGNOSIS — R91.1 SOLITARY PULMONARY NODULE: ICD-10-CM

## 2020-01-03 ENCOUNTER — TELEPHONE (OUTPATIENT)
Dept: MEDICAL GROUP | Facility: LAB | Age: 70
End: 2020-01-03

## 2020-01-03 DIAGNOSIS — I10 ESSENTIAL HYPERTENSION: ICD-10-CM

## 2020-01-03 NOTE — TELEPHONE ENCOUNTER
Hawa from [facility] asking for labs on pt . He is getting a CAT SCAN there and this requires labs.   called Hawa back asking where she needs them sent

## 2020-01-03 NOTE — TELEPHONE ENCOUNTER
Hawa called back asking for new lab orders, the have to be within 60 days. Please fax to [facility]-FAX # [phone]

## 2020-01-15 ENCOUNTER — HOSPITAL ENCOUNTER (OUTPATIENT)
Dept: LAB | Facility: MEDICAL CENTER | Age: 70
End: 2020-01-15
Attending: FAMILY MEDICINE
Payer: MEDICARE

## 2020-01-15 ENCOUNTER — OFFICE VISIT (OUTPATIENT)
Dept: MEDICAL GROUP | Facility: LAB | Age: 70
End: 2020-01-15
Payer: MEDICARE

## 2020-01-15 VITALS
OXYGEN SATURATION: 95 % | DIASTOLIC BLOOD PRESSURE: 90 MMHG | WEIGHT: 241 LBS | HEART RATE: 70 BPM | SYSTOLIC BLOOD PRESSURE: 150 MMHG | HEIGHT: 76 IN | BODY MASS INDEX: 29.35 KG/M2 | TEMPERATURE: 98.5 F | RESPIRATION RATE: 16 BRPM

## 2020-01-15 DIAGNOSIS — M54.31 SCIATICA OF RIGHT SIDE: ICD-10-CM

## 2020-01-15 DIAGNOSIS — R91.1 PULMONARY NODULE: ICD-10-CM

## 2020-01-15 DIAGNOSIS — I10 ESSENTIAL HYPERTENSION: ICD-10-CM

## 2020-01-15 DIAGNOSIS — J40 BRONCHITIS: Primary | ICD-10-CM

## 2020-01-15 DIAGNOSIS — H61.23 IMPACTED CERUMEN OF BOTH EARS: ICD-10-CM

## 2020-01-15 LAB
ANION GAP SERPL CALC-SCNC: 8 MMOL/L (ref 0–11.9)
BUN SERPL-MCNC: 16 MG/DL (ref 8–22)
CALCIUM SERPL-MCNC: 9.9 MG/DL (ref 8.5–10.5)
CHLORIDE SERPL-SCNC: 106 MMOL/L (ref 96–112)
CO2 SERPL-SCNC: 26 MMOL/L (ref 20–33)
CREAT SERPL-MCNC: 0.79 MG/DL (ref 0.5–1.4)
FLUAV+FLUBV AG SPEC QL IA: NORMAL
GLUCOSE SERPL-MCNC: 87 MG/DL (ref 65–99)
INT CON NEG: NEGATIVE
INT CON POS: POSITIVE
POTASSIUM SERPL-SCNC: 3.8 MMOL/L (ref 3.6–5.5)
SODIUM SERPL-SCNC: 140 MMOL/L (ref 135–145)

## 2020-01-15 PROCEDURE — 80048 BASIC METABOLIC PNL TOTAL CA: CPT

## 2020-01-15 PROCEDURE — 36415 COLL VENOUS BLD VENIPUNCTURE: CPT

## 2020-01-15 PROCEDURE — 99214 OFFICE O/P EST MOD 30 MIN: CPT | Performed by: FAMILY MEDICINE

## 2020-01-15 PROCEDURE — 87804 INFLUENZA ASSAY W/OPTIC: CPT | Performed by: FAMILY MEDICINE

## 2020-01-15 RX ORDER — AZITHROMYCIN 250 MG/1
TABLET, FILM COATED ORAL
Qty: 6 TAB | Refills: 0 | Status: SHIPPED | OUTPATIENT
Start: 2020-01-15 | End: 2020-03-19 | Stop reason: SDUPTHER

## 2020-01-15 ASSESSMENT — PATIENT HEALTH QUESTIONNAIRE - PHQ9: CLINICAL INTERPRETATION OF PHQ2 SCORE: 0

## 2020-01-15 NOTE — PATIENT INSTRUCTIONS
Earwax Buildup  Your ears make a substance called earwax. It may also be called cerumen. Sometimes, too much earwax builds up in your ear canal. This can cause ear pain and make it harder for you to hear.  CAUSES  This condition is caused by too much earwax production or buildup.  RISK FACTORS  The following factors may make you more likely to develop this condition:  · Cleaning your ears often with swabs.  · Having narrow ear canals.  · Having earwax that is overly thick or sticky.  · Having eczema.  · Being dehydrated.  SYMPTOMS  Symptoms of this condition include:  · Reduced hearing.  · Ear drainage.  · Ear pain.  · Ear itch.  · A feeling of fullness in the ear or feeling that the ear is plugged.  · Ringing in the ear.  · Coughing.  DIAGNOSIS  Your health care provider can diagnose this condition based on your symptoms and medical history. Your health care provider will also do an ear exam to look inside your ear with a scope (otoscope). You may also have a hearing test.  TREATMENT  Treatment for this condition includes:  · Over-the-counter or prescription ear drops to soften the earwax.  · Earwax removal by a health care provider. This may be done:  ¨ By flushing the ear with body-temperature water.  ¨ With a medical instrument that has a loop at the end (earwax curette).  ¨ With a suction device.  HOME CARE INSTRUCTIONS  · Take over-the-counter and prescription medicines only as told by your health care provider.  · Do not put any objects, including an ear swab, into your ear. You can clean the opening of your ear canal with a washcloth.  · Drink enough water to keep your urine clear or pale yellow.  · If you have frequent earwax buildup or you use hearing aids, consider seeing your health care provider every 6-12 months for routine preventive ear cleanings. Keep all follow-up visits as told by your health care provider.  SEEK MEDICAL CARE IF:  · You have ear pain.  · Your condition does not improve with  treatment.  · You have hearing loss.  · You have blood, pus, or other fluid coming from your ear.  This information is not intended to replace advice given to you by your health care provider. Make sure you discuss any questions you have with your health care provider.  Document Released: 01/25/2006 Document Revised: 04/10/2017 Document Reviewed: 08/03/2016  [employer] Patient Education © 2017 [employer]

## 2020-01-21 PROBLEM — J98.4 RESTRICTIVE LUNG DISEASE: Status: ACTIVE | Noted: 2020-01-21

## 2020-01-21 NOTE — ASSESSMENT & PLAN NOTE
Illness: 14 days of illness including: nasal congestion, green/purulent rhinorrhea, sore throat, laryngitis, cough , No Sinus pain and pressure  Symptoms negative for fever, chills,   Treatments tried: OTC medications   Since onset, symptoms are worse   Similarly ill exposures: yes  Medical history negative for asthma  He  reports that he has never smoked. He has never used smokeless tobacco.

## 2020-01-21 NOTE — ASSESSMENT & PLAN NOTE
Patient was found to have a pulmonary nodule on a recent chest x-ray.  He has not scheduled the CT scan that we previously ordered yet.

## 2020-01-21 NOTE — PROGRESS NOTES
Subjective:     Chief Complaint   Patient presents with   • Sinus Problem     head cold       Bolivar Sykes is a 69 y.o. male here today for evaluation and management of:    Bronchitis  Illness: 14 days of illness including: nasal congestion, green/purulent rhinorrhea, sore throat, laryngitis, cough , No Sinus pain and pressure  Symptoms negative for fever, chills,   Treatments tried: OTC medications   Since onset, symptoms are worse   Similarly ill exposures: yes  Medical history negative for asthma  He  reports that he has never smoked. He has never used smokeless tobacco.      Pulmonary nodule  Patient was found to have a pulmonary nodule on a recent chest x-ray.  He has not scheduled the CT scan that we previously ordered yet.       Allergies   Allergen Reactions   • Morphine        Current medicines (including changes today)  Current Outpatient Medications   Medication Sig Dispense Refill   • Misc Natural Products (FIBER 7 PO) Take  by mouth.     • diclofenac EC (VOLTAREN) 75 MG Tablet Delayed Response Take 1 Tab by mouth 2 times a day as needed (pain). 180 Tab 3   • metoprolol (LOPRESSOR) 25 MG Tab Take 1 Tab by mouth 2 times a day. 180 Tab 3   • atorvastatin (LIPITOR) 40 MG Tab Take 1 Tab by mouth every day. 90 Tab 3   • losartan-hydrochlorothiazide (HYZAAR) 100-25 MG per tablet Take 1 Tab by mouth every day. 90 Tab 3   • aspirin (ECOTRIN LOW STRENGTH) 81 MG EC tablet Take 81 mg by mouth.     • Cholecalciferol (PA VITAMIN D-3) 2000 UNIT Cap Take 2,000 Units by mouth.       No current facility-administered medications for this visit.        He  has a past medical history of GERD (gastroesophageal reflux disease) (2/15/2017), Hyperlipidemia, Hypertension, Prediabetes (2/15/2017), Sleep apnea, and Tear of lateral cartilage or meniscus of knee, current (7/25/2012).    Patient Active Problem List    Diagnosis Date Noted   • Essential hypertension 08/20/2013     Priority: High   • Dyslipidemia 08/20/2013      "Priority: High   • CESAR (obstructive sleep apnea) 08/20/2013     Priority: High   • Family history of premature CAD 08/20/2013     Priority: High   • Tear of lateral cartilage or meniscus of knee, current 07/25/2012     Priority: Low   • Bronchitis 01/15/2020   • Pulmonary nodule 01/15/2020   • Sciatica of right side 01/15/2020   • Near syncope 06/25/2019   • Lightheaded 06/25/2019   • Complete tear of right rotator cuff 02/13/2019   • Double vision with both eyes open 02/13/2019   • Benign paroxysmal positional vertigo due to bilateral vestibular disorder 09/06/2018   • Polyarthritis with positive rheumatoid factor (HCC) 10/25/2017   • Chronic pain of both shoulders 10/24/2017   • Bilateral hip pain 10/24/2017   • Prediabetes 02/15/2017   • GERD (gastroesophageal reflux disease) 02/15/2017   • Esophageal stricture 02/15/2017       ROS   No fever or chills.  No nausea or vomiting.  No chest pain or palpitations.  No cough or SOB.  No pain with urination or hematuria.  No black or bloody stools.       Objective:     /90 (BP Location: Right arm, Patient Position: Sitting, BP Cuff Size: Adult)   Pulse 70   Temp 36.9 °C (98.5 °F)   Resp 16   Ht 1.93 m (6' 4\")   Wt 109.3 kg (241 lb)   SpO2 95%  Body mass index is 29.34 kg/m².   Physical Exam:  Well developed, well nourished.  Alert, oriented in no acute distress.  Eye contact is good, speech goal directed, affect calm  Eyes: conjunctiva non-injected, sclera non-icteric.  Ears: Pinna normal.  Canals totally occluded with wax bilaterally  Nose: Nares are patent.  Erythematous, swollen mucosa with yellow discharge  Mouth: Oral mucous membranes pink and moist with no lesions.  Moderate diffuse erythema the posterior pharynx without exudate  Neck Supple.  No adenopathy or masses in the neck or supraclavicular regions. No thyromegaly  Lungs: clear to auscultation bilaterally with good excursion. No wheezes or rhonchi  CV: regular rate and rhythm. No murmur  SPINE: " No significant spinal curvature on forward bend. Mild tenderness in paraspinous muscles lumbar spine without current spasm. SLT negative. DTR 2+ patella, 1+ achilles bilaterally. Strength 5/5 proximal and distal LE.  No pain with stress of SI. Full hip ROM. Poor hamstring flexibility. No symptoms with axial loading.             Assessment and Plan:   The following treatment plan was discussed    1. Bronchitis  Zithromax as directed.  Increase fluids and rest.  Stressed the importance of getting the CT for follow-up.  - azithromycin (ZITHROMAX) 250 MG Tab; 2 tabs by mouth day 1, 1 tab by mouth days 2-5  Dispense: 6 Tab; Refill: 0  - POCT Influenza A/B    2. Pulmonary nodule  CT of the chest ordered    3. Sciatica of right side  X-ray of the lumbar spine to assess.  Consider MRI.  Continue Voltaren as needed  - DX-LUMBAR SPINE-2 OR 3 VIEWS; Future    4. Impacted cerumen of both ears  Ear wash done with good effect.    Any change or worsening of signs or symptoms, patient encouraged to follow-up or report to the emergency room for further evaluation. Patient understands and agrees.    Followup: Return if symptoms worsen or fail to improve.

## 2020-01-29 DIAGNOSIS — I10 ESSENTIAL HYPERTENSION: ICD-10-CM

## 2020-01-29 DIAGNOSIS — M05.80 POLYARTHRITIS WITH POSITIVE RHEUMATOID FACTOR (HCC): ICD-10-CM

## 2020-01-29 RX ORDER — DICLOFENAC SODIUM 75 MG/1
TABLET, DELAYED RELEASE ORAL
Qty: 180 TAB | Refills: 3 | Status: SHIPPED | OUTPATIENT
Start: 2020-01-29 | End: 2021-02-23 | Stop reason: SDUPTHER

## 2020-01-29 NOTE — TELEPHONE ENCOUNTER
Received request via: Pharmacy    Was the patient seen in the last year in this department? Yes  1/15/19  Does the patient have an active prescription (recently filled or refills available) for medication(s) requested? No

## 2020-02-18 ENCOUNTER — HOSPITAL ENCOUNTER (OUTPATIENT)
Dept: RADIOLOGY | Facility: MEDICAL CENTER | Age: 70
End: 2020-02-18
Attending: FAMILY MEDICINE
Payer: MEDICARE

## 2020-02-18 ENCOUNTER — OFFICE VISIT (OUTPATIENT)
Dept: MEDICAL GROUP | Facility: LAB | Age: 70
End: 2020-02-18
Payer: MEDICARE

## 2020-02-18 VITALS
SYSTOLIC BLOOD PRESSURE: 148 MMHG | HEIGHT: 74 IN | HEART RATE: 56 BPM | DIASTOLIC BLOOD PRESSURE: 88 MMHG | TEMPERATURE: 97.9 F | BODY MASS INDEX: 30.67 KG/M2 | RESPIRATION RATE: 16 BRPM | WEIGHT: 239 LBS | OXYGEN SATURATION: 97 %

## 2020-02-18 DIAGNOSIS — M25.512 CHRONIC PAIN OF BOTH SHOULDERS: ICD-10-CM

## 2020-02-18 DIAGNOSIS — M54.31 SCIATICA OF RIGHT SIDE: ICD-10-CM

## 2020-02-18 DIAGNOSIS — R91.1 PULMONARY NODULE: ICD-10-CM

## 2020-02-18 DIAGNOSIS — M75.121 COMPLETE TEAR OF RIGHT ROTATOR CUFF, UNSPECIFIED WHETHER TRAUMATIC: ICD-10-CM

## 2020-02-18 DIAGNOSIS — K22.2 ESOPHAGEAL STRICTURE: ICD-10-CM

## 2020-02-18 DIAGNOSIS — G89.29 CHRONIC PAIN OF BOTH SHOULDERS: ICD-10-CM

## 2020-02-18 DIAGNOSIS — E78.5 DYSLIPIDEMIA: ICD-10-CM

## 2020-02-18 DIAGNOSIS — R73.03 PREDIABETES: ICD-10-CM

## 2020-02-18 DIAGNOSIS — Z00.00 MEDICARE ANNUAL WELLNESS VISIT, SUBSEQUENT: ICD-10-CM

## 2020-02-18 DIAGNOSIS — M25.511 CHRONIC PAIN OF BOTH SHOULDERS: ICD-10-CM

## 2020-02-18 DIAGNOSIS — I10 ESSENTIAL HYPERTENSION: ICD-10-CM

## 2020-02-18 DIAGNOSIS — K21.9 GASTROESOPHAGEAL REFLUX DISEASE, ESOPHAGITIS PRESENCE NOT SPECIFIED: ICD-10-CM

## 2020-02-18 DIAGNOSIS — M05.80 POLYARTHRITIS WITH POSITIVE RHEUMATOID FACTOR (HCC): ICD-10-CM

## 2020-02-18 DIAGNOSIS — G47.33 OSA (OBSTRUCTIVE SLEEP APNEA): ICD-10-CM

## 2020-02-18 DIAGNOSIS — H81.13 BENIGN PAROXYSMAL POSITIONAL VERTIGO DUE TO BILATERAL VESTIBULAR DISORDER: ICD-10-CM

## 2020-02-18 PROBLEM — R55 NEAR SYNCOPE: Status: RESOLVED | Noted: 2019-06-25 | Resolved: 2020-02-18

## 2020-02-18 PROBLEM — R42 LIGHTHEADED: Status: RESOLVED | Noted: 2019-06-25 | Resolved: 2020-02-18

## 2020-02-18 PROBLEM — M25.551 BILATERAL HIP PAIN: Status: RESOLVED | Noted: 2017-10-24 | Resolved: 2020-02-18

## 2020-02-18 PROBLEM — J98.4 RESTRICTIVE LUNG DISEASE: Status: RESOLVED | Noted: 2020-01-21 | Resolved: 2020-02-18

## 2020-02-18 PROBLEM — M25.552 BILATERAL HIP PAIN: Status: RESOLVED | Noted: 2017-10-24 | Resolved: 2020-02-18

## 2020-02-18 PROBLEM — H53.2 DOUBLE VISION WITH BOTH EYES OPEN: Status: RESOLVED | Noted: 2019-02-13 | Resolved: 2020-02-18

## 2020-02-18 PROBLEM — J40 BRONCHITIS: Status: RESOLVED | Noted: 2020-01-15 | Resolved: 2020-02-18

## 2020-02-18 PROCEDURE — 72100 X-RAY EXAM L-S SPINE 2/3 VWS: CPT

## 2020-02-18 PROCEDURE — G0439 PPPS, SUBSEQ VISIT: HCPCS | Performed by: FAMILY MEDICINE

## 2020-02-18 ASSESSMENT — PATIENT HEALTH QUESTIONNAIRE - PHQ9: CLINICAL INTERPRETATION OF PHQ2 SCORE: 0

## 2020-02-18 ASSESSMENT — ENCOUNTER SYMPTOMS: GENERAL WELL-BEING: GOOD

## 2020-02-18 ASSESSMENT — ACTIVITIES OF DAILY LIVING (ADL): BATHING_REQUIRES_ASSISTANCE: 0

## 2020-02-18 NOTE — PROGRESS NOTES
Chief Complaint   Patient presents with   • Medicare Annual Wellness         HPI:  Bolivar is a 69 y.o. here for Medicare Annual Wellness Visit        Patient Active Problem List    Diagnosis Date Noted   • Essential hypertension 08/20/2013     Priority: High   • Dyslipidemia 08/20/2013     Priority: High   • CESAR (obstructive sleep apnea) 08/20/2013     Priority: High   • Family history of premature CAD 08/20/2013     Priority: High   • Pulmonary nodule 01/15/2020   • Sciatica of right side 01/15/2020   • Complete tear of right rotator cuff 02/13/2019   • Benign paroxysmal positional vertigo due to bilateral vestibular disorder 09/06/2018   • Polyarthritis with positive rheumatoid factor (HCC) 10/25/2017   • Chronic pain of both shoulders 10/24/2017   • Prediabetes 02/15/2017   • GERD (gastroesophageal reflux disease) 02/15/2017   • Esophageal stricture 02/15/2017       Current Outpatient Medications   Medication Sig Dispense Refill   • diclofenac EC (VOLTAREN) 75 MG Tablet Delayed Response TAKE 1 TABLET TWICE DAILY AS NEEDED FOR PAIN 180 Tab 3   • metoprolol (LOPRESSOR) 25 MG Tab Take 1 Tab by mouth 2 times a day. 180 Tab 3   • Misc Natural Products (FIBER 7 PO) Take  by mouth.     • atorvastatin (LIPITOR) 40 MG Tab Take 1 Tab by mouth every day. 90 Tab 3   • losartan-hydrochlorothiazide (HYZAAR) 100-25 MG per tablet Take 1 Tab by mouth every day. 90 Tab 3   • aspirin (ECOTRIN LOW STRENGTH) 81 MG EC tablet Take 81 mg by mouth.     • Cholecalciferol (PA VITAMIN D-3) 2000 UNIT Cap Take 2,000 Units by mouth.       No current facility-administered medications for this visit.         Patient is taking medications as noted in medication list.  Current supplements as per medication list.     Allergies: Morphine    Current social contact/activities: plays golf, goes out to dinner, visits with friends     Is patient current with immunizations? No, due for SHINGRIX (Shingles). Patient is interested in receiving NONE  today.    He  reports that he has never smoked. He has never used smokeless tobacco. He reports current alcohol use of about 8.4 oz of alcohol per week. He reports that he does not use drugs.  Counseling given: Not Answered        DPA/Advanced directive: Patient does not have an Advanced Directive.  A packet and workshop information was given on Advanced Directives.    ROS:    Gait: Uses no assistive device   Ostomy: No   Other tubes: No   Amputations: No   Chronic oxygen use Yes CPAP   Last eye exam 15 months ago   Wears hearing aids: No   : Denies any urinary leakage during the last 6 months          Depression Screening    Little interest or pleasure in doing things?  0 - not at all  Feeling down, depressed, or hopeless? 0 - not at all  Patient Health Questionnaire Score: 0    If depressive symptoms identified deferred to follow up visit unless specifically addressed in assessment and plan.    Interpretation of PHQ-9 Total Score   Score Severity   1-4 No Depression   5-9 Mild Depression   10-14 Moderate Depression   15-19 Moderately Severe Depression   20-27 Severe Depression    Screening for Cognitive Impairment    Three Minute Recall (village, kitchen, baby)  3/3    Tay clock face with all 12 numbers and set the hands to show 10 past 10.  Yes 5/5  If cognitive concerns identified, deferred for follow up unless specifically addressed in assessment and plan.    Fall Risk Assessment    Has the patient had two or more falls in the last year or any fall with injury in the last year?  No  If fall risk identified, deferred for follow up unless specifically addressed in assessment and plan.    Safety Assessment    Throw rugs on floor.  Yes  Handrails on all stairs.  Yes  Good lighting in all hallways.  Yes  Difficulty hearing.  No  Patient counseled about all safety risks that were identified.    Functional Assessment ADLs    Are there any barriers preventing you from cooking for yourself or meeting nutritional  needs?  No.    Are there any barriers preventing you from driving safely or obtaining transportation?  No.    Are there any barriers preventing you from using a telephone or calling for help?  No.    Are there any barriers preventing you from shopping?  No.    Are there any barriers preventing you from taking care of your own finances?  No.    Are there any barriers preventing you from managing your medications?  No.    Are there any barriers preventing you from showering, bathing or dressing yourself?  No.    Are you currently engaging in any exercise or physical activity?  Yes.  Rides bike in summertime, cuts wood  What is your perception of your health?  Good.    Health Maintenance Summary                HEPATITIS C SCREENING Overdue 1950     IMM ZOSTER VACCINES Overdue 7/8/1999     Annual Wellness Visit Next Due 2/18/2021      Done 2/18/2020 SUBSEQUENT ANNUAL WELLNESS VISIT-INCLUDES PPPS ()     Patient has more history with this topic...    IMM DTaP/Tdap/Td Vaccine Next Due 2/15/2027      Done 2/15/2017 Imm Admin: Tdap Vaccine          Patient Care Team:  Nona Mcmahan M.D. as PCP - General (Family Medicine)  Will Ruiz M.D. as Consulting Physician (Cardiology)  Heidy Jefferson D.P.M. as Consulting Physician (Podiatry)    Social History     Tobacco Use   • Smoking status: Never Smoker   • Smokeless tobacco: Never Used   Substance Use Topics   • Alcohol use: Yes     Alcohol/week: 8.4 oz     Types: 14 Cans of beer per week   • Drug use: No     Family History   Problem Relation Age of Onset   • Heart Attack Brother    • Hypertension Brother    • Stroke Brother    • Arthritis Mother    • Heart Disease Mother    • Hypertension Mother    • Heart Disease Father    • Hypertension Father      He  has a past medical history of GERD (gastroesophageal reflux disease) (2/15/2017), Hyperlipidemia, Hypertension, Prediabetes (2/15/2017), Sleep apnea, and Tear of lateral cartilage or meniscus of knee, current  "(7/25/2012).   Past Surgical History:   Procedure Laterality Date   • APPENDECTOMY     • HERNIA REPAIR     • KNEE ARTHROSCOPY Bilateral     4 total           Exam:     /88 (BP Location: Left arm, Patient Position: Sitting, BP Cuff Size: Large adult)   Pulse (!) 56   Temp 36.6 °C (97.9 °F) (Temporal)   Resp 16   Ht 1.87 m (6' 1.62\")   Wt 108.4 kg (239 lb)   SpO2 97%  Body mass index is 31 kg/m².    Hearing fair  Dentition good  Alert, oriented in no acute distress.  Eye contact is good, speech goal directed, affect calm  Constitutional: Alert, no distress.  Skin: Warm, dry, good turgor, no rashes in visible areas.  Eye: Equal, round and reactive, conjunctiva clear, lids normal.  ENMT: Lips without lesions, good dentition, oropharynx clear.  Neck: Trachea midline, no masses, no thyromegaly. No cervical or supraclavicular lymphadenopathy  Respiratory: Unlabored respiratory effort, lungs clear to auscultation, no wheezes, no ronchi.  Cardiovascular: Normal S1, S2, RRR, no murmur, no edema.  Abdomen: Soft, non-tender, no masses, no hepatosplenomegaly.  Psych: Alert and oriented x3, normal affect and mood.        Assessment and Plan. The following treatment and monitoring plan is recommended:    1. Medicare annual wellness visit, subsequent  Routine anticipatory guidance.  No special services needed at this time  - Subsequent Annual Wellness Visit - Includes PPPS ()    2. Sciatica of right side  Refer to physiatry for further evaluation treatment  - Subsequent Annual Wellness Visit - Includes PPPS ()    3. Essential hypertension  This is a chronic medical condition that is currently stable  Continue current medication  - Subsequent Annual Wellness Visit - Includes PPPS ()    4. Gastroesophageal reflux disease, esophagitis presence not specified  This is a chronic medical condition that is currently stable  Continue current medications  - Subsequent Annual Wellness Visit - Includes PPPS " ()    5. CESAR (obstructive sleep apnea)  Encourage use of CPAP.  - Subsequent Annual Wellness Visit - Includes PPPS ()    6. Polyarthritis with positive rheumatoid factor (HCC)  This is a chronic medical condition that is currently stable  Patient does not want to see rheumatology at this time  - Subsequent Annual Wellness Visit - Includes PPPS ()    7. Prediabetes  This is a chronic medical condition that is currently stable  Encourage weight loss for overall health  - Subsequent Annual Wellness Visit - Includes PPPS ()    8. Pulmonary nodule  Repeat CT shows stability of pulmonary nodule  - Subsequent Annual Wellness Visit - Includes PPPS ()    9. Benign paroxysmal positional vertigo due to bilateral vestibular disorder  This is a chronic medical condition that is currently stable  - Subsequent Annual Wellness Visit - Includes PPPS ()    10. Chronic pain of both shoulders  This is a chronic medical condition that is currently stable  Follow-up with orthopedic as needed  - Subsequent Annual Wellness Visit - Includes PPPS ()    11. Complete tear of right rotator cuff, unspecified whether traumatic  Follow-up with orthopedics as needed  - Subsequent Annual Wellness Visit - Includes PPPS ()    12. Dyslipidemia  Low-fat diet discussed.  Continue atorvastatin 40 mg daily  - Subsequent Annual Wellness Visit - Includes PPPS ()    13. Esophageal stricture  Follow-up with gastroenterology as scheduled  - Subsequent Annual Wellness Visit - Includes PPPS ()    Services suggested: No services needed at this time  Health Care Screening recommendations as per orders if indicated.  Referrals offered: PT/OT/Nutrition counseling/Behavioral Health/Smoking cessation as per orders if indicated.    Discussion today about general wellness and lifestyle habits:    · Prevent falls and reduce trip hazards; Cautioned about securing or removing rugs.  · Have a working fire alarm and carbon  monoxide detector;   · Engage in regular physical activity and social activities       Follow-up: Return in about 6 months (around 8/18/2020).

## 2020-02-20 DIAGNOSIS — M51.36 DDD (DEGENERATIVE DISC DISEASE), LUMBAR: ICD-10-CM

## 2020-02-20 DIAGNOSIS — M54.31 SCIATICA OF RIGHT SIDE: ICD-10-CM

## 2020-03-19 DIAGNOSIS — J40 BRONCHITIS: ICD-10-CM

## 2020-03-19 RX ORDER — AZITHROMYCIN 250 MG/1
TABLET, FILM COATED ORAL
Qty: 6 TAB | Refills: 0 | Status: SHIPPED | OUTPATIENT
Start: 2020-03-19 | End: 2020-03-24

## 2020-03-30 RX ORDER — ATORVASTATIN CALCIUM 40 MG/1
TABLET, FILM COATED ORAL
Qty: 90 TAB | Refills: 3 | Status: SHIPPED | OUTPATIENT
Start: 2020-03-30 | End: 2021-02-23 | Stop reason: SDUPTHER

## 2020-03-30 RX ORDER — LOSARTAN POTASSIUM AND HYDROCHLOROTHIAZIDE 25; 100 MG/1; MG/1
TABLET ORAL
Qty: 90 TAB | Refills: 3 | Status: SHIPPED | OUTPATIENT
Start: 2020-03-30 | End: 2021-02-23 | Stop reason: SDUPTHER

## 2020-05-20 ENCOUNTER — PATIENT MESSAGE (OUTPATIENT)
Dept: MEDICAL GROUP | Facility: LAB | Age: 70
End: 2020-05-20

## 2020-05-20 RX ORDER — FLUTICASONE PROPIONATE 50 MCG
2 SPRAY, SUSPENSION (ML) NASAL DAILY
Qty: 16 G | Refills: 3 | Status: SHIPPED | OUTPATIENT
Start: 2020-05-20 | End: 2024-03-20 | Stop reason: SDUPTHER

## 2020-08-13 ENCOUNTER — OFFICE VISIT (OUTPATIENT)
Dept: MEDICAL GROUP | Facility: LAB | Age: 70
End: 2020-08-13
Payer: MEDICARE

## 2020-08-13 VITALS
WEIGHT: 235 LBS | RESPIRATION RATE: 16 BRPM | OXYGEN SATURATION: 95 % | SYSTOLIC BLOOD PRESSURE: 134 MMHG | DIASTOLIC BLOOD PRESSURE: 86 MMHG | BODY MASS INDEX: 30.16 KG/M2 | HEIGHT: 74 IN | TEMPERATURE: 98.5 F | HEART RATE: 60 BPM

## 2020-08-13 DIAGNOSIS — R73.03 PREDIABETES: ICD-10-CM

## 2020-08-13 DIAGNOSIS — E78.5 DYSLIPIDEMIA: ICD-10-CM

## 2020-08-13 DIAGNOSIS — M05.80 POLYARTHRITIS WITH POSITIVE RHEUMATOID FACTOR (HCC): ICD-10-CM

## 2020-08-13 DIAGNOSIS — I10 ESSENTIAL HYPERTENSION: ICD-10-CM

## 2020-08-13 DIAGNOSIS — G47.33 OSA ON CPAP: ICD-10-CM

## 2020-08-13 PROCEDURE — 99214 OFFICE O/P EST MOD 30 MIN: CPT | Performed by: FAMILY MEDICINE

## 2020-08-13 ASSESSMENT — FIBROSIS 4 INDEX: FIB4 SCORE: 1.38

## 2020-08-13 NOTE — ASSESSMENT & PLAN NOTE
Patient reports his blood pressures have been running high.. Currently taking losartan--25 mg and metoprolol 25 mg daily as directed.   He is taking baby aspirin daily.   He is monitoring BP at home.   Denies symptoms low BP: light-headed, tunnel-vision, unusual fatigue.   Denies symptoms high BP:pounding headache, visual changes, palpitations, flushed face.   Denies medicine side effects: unusual fatigue, slow heartbeat, foot/leg swelling, cough.

## 2020-08-13 NOTE — PROGRESS NOTES
Subjective:     Chief Complaint   Patient presents with   • Follow-Up     6 month followup       Josh Sykes is a 70 y.o. male here today for evaluation and management of:    Dyslipidemia  Dyslipidemia Chronic condition. Current treatments: diet/exercise/medicines. He is taking atorvastatin 40 mg as directed. Current side effects: none.       Essential hypertension  Patient reports his blood pressures have been running high.. Currently taking losartan--25 mg and metoprolol 25 mg daily as directed.   He is taking baby aspirin daily.   He is monitoring BP at home.   Denies symptoms low BP: light-headed, tunnel-vision, unusual fatigue.   Denies symptoms high BP:pounding headache, visual changes, palpitations, flushed face.   Denies medicine side effects: unusual fatigue, slow heartbeat, foot/leg swelling, cough.         Allergies   Allergen Reactions   • Morphine        Current medicines (including changes today)  Current Outpatient Medications   Medication Sig Dispense Refill   • fluticasone (FLONASE) 50 MCG/ACT nasal spray Spray 2 Sprays in nose every day. 16 g 3   • losartan-hydrochlorothiazide (HYZAAR) 100-25 MG per tablet TAKE 1 TABLET EVERY DAY 90 Tab 3   • atorvastatin (LIPITOR) 40 MG Tab TAKE 1 TABLET EVERY DAY 90 Tab 3   • diclofenac EC (VOLTAREN) 75 MG Tablet Delayed Response TAKE 1 TABLET TWICE DAILY AS NEEDED FOR PAIN 180 Tab 3   • metoprolol (LOPRESSOR) 25 MG Tab Take 1 Tab by mouth 2 times a day. 180 Tab 3   • Misc Natural Products (FIBER 7 PO) Take  by mouth.     • aspirin (ECOTRIN LOW STRENGTH) 81 MG EC tablet Take 81 mg by mouth.     • Cholecalciferol (PA VITAMIN D-3) 2000 UNIT Cap Take 2,000 Units by mouth.       No current facility-administered medications for this visit.        He  has a past medical history of GERD (gastroesophageal reflux disease) (2/15/2017), Hyperlipidemia, Hypertension, Prediabetes (2/15/2017), Sleep apnea, and Tear of lateral cartilage or meniscus of knee,  "current (7/25/2012).    Patient Active Problem List    Diagnosis Date Noted   • Essential hypertension 08/20/2013     Priority: High   • Dyslipidemia 08/20/2013     Priority: High   • CESAR on CPAP 08/20/2013     Priority: High   • Family history of premature CAD 08/20/2013     Priority: High   • Pulmonary nodule 01/15/2020   • Sciatica of right side 01/15/2020   • Complete tear of right rotator cuff 02/13/2019   • Benign paroxysmal positional vertigo due to bilateral vestibular disorder 09/06/2018   • Polyarthritis with positive rheumatoid factor (HCC) 10/25/2017   • Chronic pain of both shoulders 10/24/2017   • Prediabetes 02/15/2017   • GERD (gastroesophageal reflux disease) 02/15/2017   • Esophageal stricture 02/15/2017       ROS   No fever or chills.  No nausea or vomiting.  No chest pain or palpitations.  No cough or SOB.  No pain with urination or hematuria.  No black or bloody stools.       Objective:     /86 (BP Location: Right arm, Patient Position: Sitting, BP Cuff Size: Adult)   Pulse 60   Temp 36.9 °C (98.5 °F) (Temporal)   Resp 16   Ht 1.87 m (6' 1.62\")   Wt 106.6 kg (235 lb)   SpO2 95%  Body mass index is 30.48 kg/m².   Physical Exam:  Well developed, well nourished.  Alert, oriented in no acute distress.  Eye contact is good, speech goal directed, affect calm  Eyes: conjunctiva non-injected, sclera non-icteric.  Neck Supple.  No adenopathy or masses in the neck or supraclavicular regions. No thyromegaly  Lungs: clear to auscultation bilaterally with good excursion. No wheezes or rhonchi  CV: regular rate and rhythm. No murmur  Chest wall with slight tenderness on the right mid chest at the sternum and costal margins.      Assessment and Plan:   The following treatment plan was discussed    1. Essential hypertension  Increase metoprolol to 50 mg twice a day.  Patient to let me know what his blood pressures are doing in 7 to 10 days.  If this is controlling it then I will call in the new " dose.  Continue losartan-hydrochlorothiazide  - Comp Metabolic Panel; Future    2. Dyslipidemia  Check labs and adjust atorvastatin dose as needed  - Lipid Profile; Future  - TSH; Future  - FREE THYROXINE; Future    3. Polyarthritis with positive rheumatoid factor (HCC)  Recheck labs.  Patient is currently not undergoing treatment  - CBC WITH DIFFERENTIAL; Future  - CCP ANTIBODY; Future  - RHEUMATOID ARTHRITIS FACTOR; Future  - Sed Rate; Future  - CRP QUANTITIVE (NON-CARDIAC); Future    4. Prediabetes  Check labs.  Dietary modifications discussed  - Comp Metabolic Panel; Future  - HEMOGLOBIN A1C; Future    5. CESAR on CPAP  Continue CPAP for overall health improvement.    Any change or worsening of signs or symptoms, patient encouraged to follow-up or report to the emergency room for further evaluation. Patient understands and agrees.    Followup: Return in about 6 months (around 2/13/2021).

## 2020-08-13 NOTE — PATIENT INSTRUCTIONS
Take 2 metoprolol 25 mg tablets twice a day.  Let me know your blood pressure readings in 7-10 days.

## 2020-08-19 ENCOUNTER — HOSPITAL ENCOUNTER (OUTPATIENT)
Dept: LAB | Facility: MEDICAL CENTER | Age: 70
End: 2020-08-19
Attending: FAMILY MEDICINE
Payer: MEDICARE

## 2020-08-19 DIAGNOSIS — E78.5 DYSLIPIDEMIA: ICD-10-CM

## 2020-08-19 DIAGNOSIS — R73.03 PREDIABETES: ICD-10-CM

## 2020-08-19 DIAGNOSIS — I10 ESSENTIAL HYPERTENSION: ICD-10-CM

## 2020-08-19 DIAGNOSIS — M05.80 POLYARTHRITIS WITH POSITIVE RHEUMATOID FACTOR (HCC): ICD-10-CM

## 2020-08-19 LAB
ALBUMIN SERPL BCP-MCNC: 4.7 G/DL (ref 3.2–4.9)
ALBUMIN/GLOB SERPL: 2 G/DL
ALP SERPL-CCNC: 75 U/L (ref 30–99)
ALT SERPL-CCNC: 40 U/L (ref 2–50)
ANION GAP SERPL CALC-SCNC: 13 MMOL/L (ref 7–16)
AST SERPL-CCNC: 31 U/L (ref 12–45)
BASOPHILS # BLD AUTO: 0.4 % (ref 0–1.8)
BASOPHILS # BLD: 0.02 K/UL (ref 0–0.12)
BILIRUB SERPL-MCNC: 1 MG/DL (ref 0.1–1.5)
BUN SERPL-MCNC: 21 MG/DL (ref 8–22)
CALCIUM SERPL-MCNC: 9.3 MG/DL (ref 8.5–10.5)
CHLORIDE SERPL-SCNC: 103 MMOL/L (ref 96–112)
CHOLEST SERPL-MCNC: 143 MG/DL (ref 100–199)
CO2 SERPL-SCNC: 24 MMOL/L (ref 20–33)
CREAT SERPL-MCNC: 0.81 MG/DL (ref 0.5–1.4)
CRP SERPL HS-MCNC: 0.06 MG/DL (ref 0–0.75)
EOSINOPHIL # BLD AUTO: 0.22 K/UL (ref 0–0.51)
EOSINOPHIL NFR BLD: 4.2 % (ref 0–6.9)
ERYTHROCYTE [DISTWIDTH] IN BLOOD BY AUTOMATED COUNT: 43.5 FL (ref 35.9–50)
ERYTHROCYTE [SEDIMENTATION RATE] IN BLOOD BY WESTERGREN METHOD: 2 MM/HOUR (ref 0–20)
EST. AVERAGE GLUCOSE BLD GHB EST-MCNC: 131 MG/DL
FASTING STATUS PATIENT QL REPORTED: NORMAL
GLOBULIN SER CALC-MCNC: 2.4 G/DL (ref 1.9–3.5)
GLUCOSE SERPL-MCNC: 101 MG/DL (ref 65–99)
HBA1C MFR BLD: 6.2 % (ref 0–5.6)
HCT VFR BLD AUTO: 43.5 % (ref 42–52)
HDLC SERPL-MCNC: 38 MG/DL
HGB BLD-MCNC: 14.7 G/DL (ref 14–18)
IMM GRANULOCYTES # BLD AUTO: 0.02 K/UL (ref 0–0.11)
IMM GRANULOCYTES NFR BLD AUTO: 0.4 % (ref 0–0.9)
LDLC SERPL CALC-MCNC: 83 MG/DL
LYMPHOCYTES # BLD AUTO: 1.34 K/UL (ref 1–4.8)
LYMPHOCYTES NFR BLD: 25.3 % (ref 22–41)
MCH RBC QN AUTO: 31.9 PG (ref 27–33)
MCHC RBC AUTO-ENTMCNC: 33.8 G/DL (ref 33.7–35.3)
MCV RBC AUTO: 94.4 FL (ref 81.4–97.8)
MONOCYTES # BLD AUTO: 0.62 K/UL (ref 0–0.85)
MONOCYTES NFR BLD AUTO: 11.7 % (ref 0–13.4)
NEUTROPHILS # BLD AUTO: 3.08 K/UL (ref 1.82–7.42)
NEUTROPHILS NFR BLD: 58 % (ref 44–72)
NRBC # BLD AUTO: 0 K/UL
NRBC BLD-RTO: 0 /100 WBC
PLATELET # BLD AUTO: 233 K/UL (ref 164–446)
PMV BLD AUTO: 10.4 FL (ref 9–12.9)
POTASSIUM SERPL-SCNC: 4 MMOL/L (ref 3.6–5.5)
PROT SERPL-MCNC: 7.1 G/DL (ref 6–8.2)
RBC # BLD AUTO: 4.61 M/UL (ref 4.7–6.1)
RHEUMATOID FACT SER IA-ACNC: 42 IU/ML (ref 0–14)
SODIUM SERPL-SCNC: 140 MMOL/L (ref 135–145)
T4 FREE SERPL-MCNC: 1.42 NG/DL (ref 0.93–1.7)
TRIGL SERPL-MCNC: 109 MG/DL (ref 0–149)
TSH SERPL DL<=0.005 MIU/L-ACNC: 1.22 UIU/ML (ref 0.38–5.33)
WBC # BLD AUTO: 5.3 K/UL (ref 4.8–10.8)

## 2020-08-19 PROCEDURE — 84443 ASSAY THYROID STIM HORMONE: CPT

## 2020-08-19 PROCEDURE — 80053 COMPREHEN METABOLIC PANEL: CPT

## 2020-08-19 PROCEDURE — 85652 RBC SED RATE AUTOMATED: CPT

## 2020-08-19 PROCEDURE — 80061 LIPID PANEL: CPT

## 2020-08-19 PROCEDURE — 86200 CCP ANTIBODY: CPT

## 2020-08-19 PROCEDURE — 85025 COMPLETE CBC W/AUTO DIFF WBC: CPT

## 2020-08-19 PROCEDURE — 86140 C-REACTIVE PROTEIN: CPT

## 2020-08-19 PROCEDURE — 84439 ASSAY OF FREE THYROXINE: CPT

## 2020-08-19 PROCEDURE — 83036 HEMOGLOBIN GLYCOSYLATED A1C: CPT | Mod: GA

## 2020-08-19 PROCEDURE — 36415 COLL VENOUS BLD VENIPUNCTURE: CPT

## 2020-08-19 PROCEDURE — 86431 RHEUMATOID FACTOR QUANT: CPT

## 2020-08-21 LAB — CCP IGG SERPL-ACNC: 8 UNITS (ref 0–19)

## 2021-01-15 DIAGNOSIS — Z23 NEED FOR VACCINATION: ICD-10-CM

## 2021-01-22 ENCOUNTER — IMMUNIZATION (OUTPATIENT)
Dept: FAMILY PLANNING/WOMEN'S HEALTH CLINIC | Facility: IMMUNIZATION CENTER | Age: 71
End: 2021-01-22
Attending: INTERNAL MEDICINE
Payer: MEDICARE

## 2021-01-22 DIAGNOSIS — Z23 ENCOUNTER FOR VACCINATION: Primary | ICD-10-CM

## 2021-01-22 DIAGNOSIS — Z23 NEED FOR VACCINATION: ICD-10-CM

## 2021-01-22 PROCEDURE — 91300 PFIZER SARS-COV-2 VACCINE: CPT

## 2021-01-22 PROCEDURE — 0001A PFIZER SARS-COV-2 VACCINE: CPT

## 2021-02-12 ENCOUNTER — IMMUNIZATION (OUTPATIENT)
Dept: FAMILY PLANNING/WOMEN'S HEALTH CLINIC | Facility: IMMUNIZATION CENTER | Age: 71
End: 2021-02-12
Attending: INTERNAL MEDICINE
Payer: MEDICARE

## 2021-02-12 DIAGNOSIS — Z23 ENCOUNTER FOR VACCINATION: Primary | ICD-10-CM

## 2021-02-12 PROCEDURE — 91300 PFIZER SARS-COV-2 VACCINE: CPT

## 2021-02-12 PROCEDURE — 0002A PFIZER SARS-COV-2 VACCINE: CPT

## 2021-02-17 SDOH — ECONOMIC STABILITY: HOUSING INSECURITY: IN THE LAST 12 MONTHS, HOW MANY PLACES HAVE YOU LIVED?: 1

## 2021-02-17 SDOH — ECONOMIC STABILITY: INCOME INSECURITY: IN THE LAST 12 MONTHS, WAS THERE A TIME WHEN YOU WERE NOT ABLE TO PAY THE MORTGAGE OR RENT ON TIME?: NO

## 2021-02-17 SDOH — HEALTH STABILITY: MENTAL HEALTH
STRESS IS WHEN SOMEONE FEELS TENSE, NERVOUS, ANXIOUS, OR CAN'T SLEEP AT NIGHT BECAUSE THEIR MIND IS TROUBLED. HOW STRESSED ARE YOU?: NOT AT ALL

## 2021-02-17 SDOH — ECONOMIC STABILITY: TRANSPORTATION INSECURITY
IN THE PAST 12 MONTHS, HAS LACK OF RELIABLE TRANSPORTATION KEPT YOU FROM MEDICAL APPOINTMENTS, MEETINGS, WORK OR FROM GETTING THINGS NEEDED FOR DAILY LIVING?: NO

## 2021-02-17 SDOH — ECONOMIC STABILITY: HOUSING INSECURITY
IN THE LAST 12 MONTHS, WAS THERE A TIME WHEN YOU DID NOT HAVE A STEADY PLACE TO SLEEP OR SLEPT IN A SHELTER (INCLUDING NOW)?: NO

## 2021-02-17 SDOH — ECONOMIC STABILITY: TRANSPORTATION INSECURITY
IN THE PAST 12 MONTHS, HAS THE LACK OF TRANSPORTATION KEPT YOU FROM MEDICAL APPOINTMENTS OR FROM GETTING MEDICATIONS?: NO

## 2021-02-17 SDOH — HEALTH STABILITY: PHYSICAL HEALTH: ON AVERAGE, HOW MANY DAYS PER WEEK DO YOU ENGAGE IN MODERATE TO STRENUOUS EXERCISE (LIKE A BRISK WALK)?: 4 DAYS

## 2021-02-17 SDOH — HEALTH STABILITY: PHYSICAL HEALTH: ON AVERAGE, HOW MANY MINUTES DO YOU ENGAGE IN EXERCISE AT THIS LEVEL?: 40 MINUTES

## 2021-02-17 ASSESSMENT — SOCIAL DETERMINANTS OF HEALTH (SDOH)
IN A TYPICAL WEEK, HOW MANY TIMES DO YOU TALK ON THE PHONE WITH FAMILY, FRIENDS, OR NEIGHBORS?: TWICE A WEEK
DO YOU BELONG TO ANY CLUBS OR ORGANIZATIONS SUCH AS CHURCH GROUPS UNIONS, FRATERNAL OR ATHLETIC GROUPS, OR SCHOOL GROUPS?: NO
WITHIN THE PAST 12 MONTHS, THE FOOD YOU BOUGHT JUST DIDN'T LAST AND YOU DIDN'T HAVE MONEY TO GET MORE: NEVER TRUE
HOW HARD IS IT FOR YOU TO PAY FOR THE VERY BASICS LIKE FOOD, HOUSING, MEDICAL CARE, AND HEATING?: NOT HARD AT ALL
WITHIN THE PAST 12 MONTHS, YOU WORRIED THAT YOUR FOOD WOULD RUN OUT BEFORE YOU GOT THE MONEY TO BUY MORE: NEVER TRUE
HOW OFTEN DO YOU HAVE SIX OR MORE DRINKS ON ONE OCCASION: LESS THAN MONTHLY
HOW OFTEN DO YOU HAVE A DRINK CONTAINING ALCOHOL: 4 OR MORE TIMES A WEEK
HOW MANY DRINKS CONTAINING ALCOHOL DO YOU HAVE ON A TYPICAL DAY WHEN YOU ARE DRINKING: 5 OR 6
HOW OFTEN DO YOU ATTEND CHURCH OR RELIGIOUS SERVICES?: NEVER
HOW OFTEN DO YOU GET TOGETHER WITH FRIENDS OR RELATIVES?: DECLINE
HOW OFTEN DO YOU ATTENT MEETINGS OF THE CLUB OR ORGANIZATION YOU BELONG TO?: NEVER

## 2021-02-23 ENCOUNTER — OFFICE VISIT (OUTPATIENT)
Dept: MEDICAL GROUP | Facility: LAB | Age: 71
End: 2021-02-23
Payer: MEDICARE

## 2021-02-23 VITALS
OXYGEN SATURATION: 96 % | TEMPERATURE: 97.8 F | HEIGHT: 75 IN | RESPIRATION RATE: 16 BRPM | SYSTOLIC BLOOD PRESSURE: 122 MMHG | BODY MASS INDEX: 29.72 KG/M2 | WEIGHT: 239 LBS | DIASTOLIC BLOOD PRESSURE: 70 MMHG | HEART RATE: 58 BPM

## 2021-02-23 DIAGNOSIS — H81.13 BENIGN PAROXYSMAL POSITIONAL VERTIGO DUE TO BILATERAL VESTIBULAR DISORDER: ICD-10-CM

## 2021-02-23 DIAGNOSIS — I10 ESSENTIAL HYPERTENSION: ICD-10-CM

## 2021-02-23 DIAGNOSIS — G47.33 OSA ON CPAP: ICD-10-CM

## 2021-02-23 DIAGNOSIS — E78.5 DYSLIPIDEMIA: ICD-10-CM

## 2021-02-23 DIAGNOSIS — R73.03 PREDIABETES: ICD-10-CM

## 2021-02-23 DIAGNOSIS — Z00.00 MEDICARE ANNUAL WELLNESS VISIT, SUBSEQUENT: Primary | ICD-10-CM

## 2021-02-23 DIAGNOSIS — M05.80 POLYARTHRITIS WITH POSITIVE RHEUMATOID FACTOR (HCC): ICD-10-CM

## 2021-02-23 DIAGNOSIS — R91.1 PULMONARY NODULE: ICD-10-CM

## 2021-02-23 DIAGNOSIS — K21.9 GASTROESOPHAGEAL REFLUX DISEASE, UNSPECIFIED WHETHER ESOPHAGITIS PRESENT: ICD-10-CM

## 2021-02-23 DIAGNOSIS — M54.12 CERVICAL RADICULOPATHY: ICD-10-CM

## 2021-02-23 PROCEDURE — G0439 PPPS, SUBSEQ VISIT: HCPCS | Performed by: FAMILY MEDICINE

## 2021-02-23 RX ORDER — LOSARTAN POTASSIUM AND HYDROCHLOROTHIAZIDE 25; 100 MG/1; MG/1
1 TABLET ORAL
Qty: 90 TABLET | Refills: 3 | Status: SHIPPED | OUTPATIENT
Start: 2021-02-23 | End: 2022-02-01

## 2021-02-23 RX ORDER — ATORVASTATIN CALCIUM 40 MG/1
40 TABLET, FILM COATED ORAL
Qty: 90 TABLET | Refills: 3 | Status: SHIPPED | OUTPATIENT
Start: 2021-02-23 | End: 2022-02-01

## 2021-02-23 RX ORDER — DICLOFENAC SODIUM 75 MG/1
TABLET, DELAYED RELEASE ORAL
Qty: 180 TABLET | Refills: 3 | Status: SHIPPED | OUTPATIENT
Start: 2021-02-23 | End: 2022-02-01

## 2021-02-23 ASSESSMENT — PATIENT HEALTH QUESTIONNAIRE - PHQ9: CLINICAL INTERPRETATION OF PHQ2 SCORE: 0

## 2021-02-23 ASSESSMENT — FIBROSIS 4 INDEX: FIB4 SCORE: 1.49

## 2021-02-23 ASSESSMENT — ACTIVITIES OF DAILY LIVING (ADL): BATHING_REQUIRES_ASSISTANCE: 0

## 2021-02-23 ASSESSMENT — ENCOUNTER SYMPTOMS: GENERAL WELL-BEING: GOOD

## 2021-02-23 NOTE — PROGRESS NOTES
Chief Complaint   Patient presents with   • Annual Wellness Visit         HPI:  Bolivar is a 70 y.o. here for Medicare Annual Wellness Visit    Still having vertigo symptoms when he rolls over in bead.  Went to vestibular therapy which she did not find very helpful.    Patient Active Problem List    Diagnosis Date Noted   • Essential hypertension 08/20/2013   • Dyslipidemia 08/20/2013   • CESAR on CPAP 08/20/2013   • Family history of premature CAD 08/20/2013   • Cervical radiculopathy 02/23/2021   • Pulmonary nodule 01/15/2020   • Sciatica of right side 01/15/2020   • Complete tear of right rotator cuff 02/13/2019   • Benign paroxysmal positional vertigo due to bilateral vestibular disorder 09/06/2018   • Polyarthritis with positive rheumatoid factor (HCC) 10/25/2017   • Chronic pain of both shoulders 10/24/2017   • Prediabetes 02/15/2017   • GERD (gastroesophageal reflux disease) 02/15/2017   • Esophageal stricture 02/15/2017       Current Outpatient Medications   Medication Sig Dispense Refill   • diclofenac DR (VOLTAREN) 75 MG Tablet Delayed Response TAKE 1 TABLET TWICE DAILY AS NEEDED FOR PAIN 180 tablet 3   • atorvastatin (LIPITOR) 40 MG Tab Take 1 tablet by mouth every day. 90 tablet 3   • losartan-hydrochlorothiazide (HYZAAR) 100-25 MG per tablet Take 1 tablet by mouth every day. 90 tablet 3   • metoprolol (LOPRESSOR) 50 MG Tab Take 1 Tab by mouth 2 times a day. 180 Tab 3   • amLODIPine (NORVASC) 5 MG Tab Take 1 Tab by mouth every day. 90 Tab 3   • fluticasone (FLONASE) 50 MCG/ACT nasal spray Spray 2 Sprays in nose every day. 16 g 3   • Misc Natural Products (FIBER 7 PO) Take  by mouth.     • aspirin (ECOTRIN LOW STRENGTH) 81 MG EC tablet Take 81 mg by mouth.     • Cholecalciferol (PA VITAMIN D-3) 2000 UNIT Cap Take 2,000 Units by mouth.       No current facility-administered medications for this visit.        Patient is taking medications as noted in medication list.  Current supplements as per medication list.      Allergies: Morphine    Current social contact/activities: due to covid not much.  Talk to friends on the phone.     Is patient current with immunizations? No, due for SHINGRIX (Shingles). Patient is interested in receiving NONE today.    He  reports that he has never smoked. He has never used smokeless tobacco. He reports current alcohol use of about 8.4 oz of alcohol per week. He reports that he does not use drugs.  Counseling given: Not Answered        DPA/Advanced directive: Patient does not have an Advanced Directive.  A packet and workshop information was given on Advanced Directives.    ROS:    Gait: Uses no assistive device   Ostomy: No   Other tubes: No   Amputations: No   Chronic oxygen use No   Last eye exam 2019   Wears hearing aids: No   : Denies any urinary leakage during the last 6 months      Screening:        Depression Screening    Little interest or pleasure in doing things?  0 - not at all  Feeling down, depressed, or hopeless? 0 - not at all  Patient Health Questionnaire Score: 0    If depressive symptoms identified deferred to follow up visit unless specifically addressed in assessment and plan.    Interpretation of PHQ-9 Total Score   Score Severity   1-4 No Depression   5-9 Mild Depression   10-14 Moderate Depression   15-19 Moderately Severe Depression   20-27 Severe Depression    Screening for Cognitive Impairment    Three Minute Recall (river, liz, finger)  3/3    Tay clock face with all 12 numbers and set the hands to show 10 past 11.  Yes    If cognitive concerns identified, deferred for follow up unless specifically addressed in assessment and plan.    Fall Risk Assessment    Has the patient had two or more falls in the last year or any fall with injury in the last year?  No  If fall risk identified, deferred for follow up unless specifically addressed in assessment and plan.    Safety Assessment    Throw rugs on floor.  No  Handrails on all stairs.  Yes  Good lighting in all  hallways.  Yes  Difficulty hearing.  No  Patient counseled about all safety risks that were identified.    Functional Assessment ADLs    Are there any barriers preventing you from cooking for yourself or meeting nutritional needs?  No.    Are there any barriers preventing you from driving safely or obtaining transportation?  No.    Are there any barriers preventing you from using a telephone or calling for help?  No.    Are there any barriers preventing you from shopping?  No.    Are there any barriers preventing you from taking care of your own finances?  No.    Are there any barriers preventing you from managing your medications?  No.    Are there any barriers preventing you from showering, bathing or dressing yourself?  No.    Are you currently engaging in any exercise or physical activity?  Yes.  Rides bike  What is your perception of your health?  Good.    Health Maintenance Summary                IMM ZOSTER VACCINES Overdue 7/8/1999     Annual Wellness Visit Next Due 2/24/2022      Done 2/23/2021 Visit Dx: Medicare annual wellness visit, subsequent     Patient has more history with this topic...    IMM DTaP/Tdap/Td Vaccine Next Due 2/15/2027      Done 2/15/2017 Imm Admin: Tdap Vaccine          Patient Care Team:  Nona Mcmahan M.D. as PCP - General (Family Medicine)  Will Ruiz M.D. as Consulting Physician (Cardiology)  Heidy Jefferson D.P.M. as Consulting Physician (Podiatry)    Social History     Tobacco Use   • Smoking status: Never Smoker   • Smokeless tobacco: Never Used   Substance Use Topics   • Alcohol use: Yes     Alcohol/week: 8.4 oz     Types: 14 Cans of beer per week   • Drug use: No     Family History   Problem Relation Age of Onset   • Heart Attack Brother    • Hypertension Brother    • Stroke Brother    • Arthritis Mother    • Heart Disease Mother    • Hypertension Mother    • Heart Disease Father    • Hypertension Father      He  has a past medical history of GERD (gastroesophageal  "reflux disease) (2/15/2017), Hyperlipidemia, Hypertension, Prediabetes (2/15/2017), Sleep apnea, and Tear of lateral cartilage or meniscus of knee, current (7/25/2012).   Past Surgical History:   Procedure Laterality Date   • APPENDECTOMY     • HERNIA REPAIR     • KNEE ARTHROSCOPY Bilateral     4 total           Exam:     /70 (BP Location: Left arm, Patient Position: Sitting, BP Cuff Size: Adult)   Pulse (!) 58   Temp 36.6 °C (97.8 °F) (Temporal)   Resp 16   Ht 1.905 m (6' 3\")   Wt 108 kg (239 lb)   SpO2 96%  Body mass index is 29.87 kg/m².    Hearing good.    Alert, oriented in no acute distress.  Eye contact is good, speech goal directed, affect calm  Constitutional: Alert, no distress.  Skin: Warm, dry, good turgor, no rashes in visible areas.  Eye: Equal, round and reactive, conjunctiva clear, lids normal.  ENMT: Pinnae normal.  Canals are clear bilaterally  Neck: Trachea midline, no masses, no thyromegaly. No cervical or supraclavicular lymphadenopathy  Respiratory: Unlabored respiratory effort, lungs clear to auscultation, no wheezes, no ronchi.  Cardiovascular: Normal S1, S2, RRR, no murmur, no edema.  Abdomen: Soft, non-tender, no masses, no hepatosplenomegaly.  Psych: Alert and oriented x3, normal affect and mood.        Assessment and Plan. The following treatment and monitoring plan is recommended:   1. Medicare annual wellness visit, subsequent  Routine anticipatory guidance.  No special services needed at this time    2. Essential hypertension  This is a chronic medical condition that is currently stable  Continue losartan-hydrochlorothiazide 100-25 mg, metoprolol 50 mg and amlodipine 5 mg daily.  DASH diet information given    3. Dyslipidemia  This is a chronic medical condition that is currently stable  Continue atorvastatin 40 mg daily    4. CESAR on CPAP  Continue CPAP and follow-up with pulmonology    5. Prediabetes  Dietary counseling done.    6. Gastroesophageal reflux disease, " unspecified whether esophagitis present  Dietary counseling done    7. Polyarthritis with positive rheumatoid factor (HCC)  Refill diclofenac  - diclofenac DR (VOLTAREN) 75 MG Tablet Delayed Response; TAKE 1 TABLET TWICE DAILY AS NEEDED FOR PAIN  Dispense: 180 tablet; Refill: 3    8. Benign paroxysmal positional vertigo due to bilateral vestibular disorder  Epley maneuver information given.  Consider referral to ENT.  Patient does not want proceed with the at this time    9. Pulmonary nodule  Follow-up CT in 1 year    10. Cervical radiculopathy  Exercises given.  Consider MRI if symptoms worsen        Services suggested: No services needed at this time  Health Care Screening recommendations as per orders if indicated.  Referrals offered: PT/OT/Nutrition counseling/Behavioral Health/Smoking cessation as per orders if indicated.    Discussion today about general wellness and lifestyle habits:    · Prevent falls and reduce trip hazards; Cautioned about securing or removing rugs.  · Have a working fire alarm and carbon monoxide detector;   · Engage in regular physical activity and social activities       Follow-up: Return in about 1 year (around 2/23/2022).

## 2021-09-20 ENCOUNTER — TELEPHONE (OUTPATIENT)
Dept: MEDICAL GROUP | Facility: LAB | Age: 71
End: 2021-09-20

## 2021-09-20 DIAGNOSIS — Z01.84 IMMUNITY STATUS TESTING: ICD-10-CM

## 2021-09-20 DIAGNOSIS — R73.03 PREDIABETES: ICD-10-CM

## 2021-09-20 DIAGNOSIS — Z13.0 SCREENING FOR DEFICIENCY ANEMIA: ICD-10-CM

## 2021-09-20 DIAGNOSIS — E78.5 DYSLIPIDEMIA: ICD-10-CM

## 2021-09-20 DIAGNOSIS — I10 ESSENTIAL HYPERTENSION: ICD-10-CM

## 2021-09-20 NOTE — TELEPHONE ENCOUNTER
During Farzaneh's visit, she stated that Bolivar isn't sure if he had the Chickenpox.  I informed her that there is a Titer that can be order but I had to ask you about it first.  I believe its because they are interested in getting the Shingrix vaccine.    Please advise.

## 2021-10-04 ENCOUNTER — HOSPITAL ENCOUNTER (OUTPATIENT)
Dept: LAB | Facility: MEDICAL CENTER | Age: 71
End: 2021-10-04
Attending: FAMILY MEDICINE
Payer: MEDICARE

## 2021-10-04 DIAGNOSIS — E78.5 DYSLIPIDEMIA: ICD-10-CM

## 2021-10-04 DIAGNOSIS — R73.03 PREDIABETES: ICD-10-CM

## 2021-10-04 DIAGNOSIS — Z13.0 SCREENING FOR DEFICIENCY ANEMIA: ICD-10-CM

## 2021-10-04 DIAGNOSIS — I10 ESSENTIAL HYPERTENSION: ICD-10-CM

## 2021-10-04 DIAGNOSIS — Z01.84 IMMUNITY STATUS TESTING: ICD-10-CM

## 2021-10-04 LAB
ALBUMIN SERPL BCP-MCNC: 4.5 G/DL (ref 3.2–4.9)
ALBUMIN/GLOB SERPL: 1.6 G/DL
ALP SERPL-CCNC: 86 U/L (ref 30–99)
ALT SERPL-CCNC: 38 U/L (ref 2–50)
ANION GAP SERPL CALC-SCNC: 12 MMOL/L (ref 7–16)
AST SERPL-CCNC: 27 U/L (ref 12–45)
BASOPHILS # BLD AUTO: 0.4 % (ref 0–1.8)
BASOPHILS # BLD: 0.02 K/UL (ref 0–0.12)
BILIRUB SERPL-MCNC: 0.9 MG/DL (ref 0.1–1.5)
BUN SERPL-MCNC: 21 MG/DL (ref 8–22)
CALCIUM SERPL-MCNC: 9.4 MG/DL (ref 8.5–10.5)
CHLORIDE SERPL-SCNC: 104 MMOL/L (ref 96–112)
CHOLEST SERPL-MCNC: 145 MG/DL (ref 100–199)
CO2 SERPL-SCNC: 24 MMOL/L (ref 20–33)
CREAT SERPL-MCNC: 0.83 MG/DL (ref 0.5–1.4)
EOSINOPHIL # BLD AUTO: 0.21 K/UL (ref 0–0.51)
EOSINOPHIL NFR BLD: 4.3 % (ref 0–6.9)
ERYTHROCYTE [DISTWIDTH] IN BLOOD BY AUTOMATED COUNT: 46.7 FL (ref 35.9–50)
EST. AVERAGE GLUCOSE BLD GHB EST-MCNC: 123 MG/DL
GLOBULIN SER CALC-MCNC: 2.9 G/DL (ref 1.9–3.5)
GLUCOSE SERPL-MCNC: 104 MG/DL (ref 65–99)
HBA1C MFR BLD: 5.9 % (ref 4–5.6)
HCT VFR BLD AUTO: 43.6 % (ref 42–52)
HDLC SERPL-MCNC: 37 MG/DL
HGB BLD-MCNC: 14.4 G/DL (ref 14–18)
IMM GRANULOCYTES # BLD AUTO: 0.01 K/UL (ref 0–0.11)
IMM GRANULOCYTES NFR BLD AUTO: 0.2 % (ref 0–0.9)
LDLC SERPL CALC-MCNC: 88 MG/DL
LYMPHOCYTES # BLD AUTO: 1.06 K/UL (ref 1–4.8)
LYMPHOCYTES NFR BLD: 21.8 % (ref 22–41)
MCH RBC QN AUTO: 31.9 PG (ref 27–33)
MCHC RBC AUTO-ENTMCNC: 33 G/DL (ref 33.7–35.3)
MCV RBC AUTO: 96.5 FL (ref 81.4–97.8)
MONOCYTES # BLD AUTO: 0.64 K/UL (ref 0–0.85)
MONOCYTES NFR BLD AUTO: 13.2 % (ref 0–13.4)
NEUTROPHILS # BLD AUTO: 2.92 K/UL (ref 1.82–7.42)
NEUTROPHILS NFR BLD: 60.1 % (ref 44–72)
NRBC # BLD AUTO: 0 K/UL
NRBC BLD-RTO: 0 /100 WBC
PLATELET # BLD AUTO: 251 K/UL (ref 164–446)
PMV BLD AUTO: 10.2 FL (ref 9–12.9)
POTASSIUM SERPL-SCNC: 4.4 MMOL/L (ref 3.6–5.5)
PROT SERPL-MCNC: 7.4 G/DL (ref 6–8.2)
RBC # BLD AUTO: 4.52 M/UL (ref 4.7–6.1)
SODIUM SERPL-SCNC: 140 MMOL/L (ref 135–145)
TRIGL SERPL-MCNC: 101 MG/DL (ref 0–149)
TSH SERPL DL<=0.005 MIU/L-ACNC: 1.57 UIU/ML (ref 0.38–5.33)
WBC # BLD AUTO: 4.9 K/UL (ref 4.8–10.8)

## 2021-10-04 PROCEDURE — 83036 HEMOGLOBIN GLYCOSYLATED A1C: CPT

## 2021-10-04 PROCEDURE — 85025 COMPLETE CBC W/AUTO DIFF WBC: CPT

## 2021-10-04 PROCEDURE — 86787 VARICELLA-ZOSTER ANTIBODY: CPT

## 2021-10-04 PROCEDURE — 80053 COMPREHEN METABOLIC PANEL: CPT

## 2021-10-04 PROCEDURE — 80061 LIPID PANEL: CPT

## 2021-10-04 PROCEDURE — 84443 ASSAY THYROID STIM HORMONE: CPT

## 2021-10-04 PROCEDURE — 36415 COLL VENOUS BLD VENIPUNCTURE: CPT

## 2021-10-05 LAB — VZV IGG SER IA-ACNC: 2.1

## 2022-02-01 DIAGNOSIS — M05.80 POLYARTHRITIS WITH POSITIVE RHEUMATOID FACTOR (HCC): ICD-10-CM

## 2022-02-01 RX ORDER — ATORVASTATIN CALCIUM 40 MG/1
TABLET, FILM COATED ORAL
Qty: 90 TABLET | Refills: 0 | Status: SHIPPED | OUTPATIENT
Start: 2022-02-01 | End: 2022-02-24 | Stop reason: SDUPTHER

## 2022-02-01 RX ORDER — LOSARTAN POTASSIUM AND HYDROCHLOROTHIAZIDE 25; 100 MG/1; MG/1
TABLET ORAL
Qty: 90 TABLET | Refills: 0 | Status: SHIPPED | OUTPATIENT
Start: 2022-02-01 | End: 2022-02-24 | Stop reason: SDUPTHER

## 2022-02-01 RX ORDER — DICLOFENAC SODIUM 75 MG/1
TABLET, DELAYED RELEASE ORAL
Qty: 180 TABLET | Refills: 0 | Status: SHIPPED | OUTPATIENT
Start: 2022-02-01 | End: 2022-02-24 | Stop reason: SDUPTHER

## 2022-02-22 SDOH — ECONOMIC STABILITY: HOUSING INSECURITY: IN THE LAST 12 MONTHS, HOW MANY PLACES HAVE YOU LIVED?: 1

## 2022-02-22 SDOH — ECONOMIC STABILITY: FOOD INSECURITY: WITHIN THE PAST 12 MONTHS, YOU WORRIED THAT YOUR FOOD WOULD RUN OUT BEFORE YOU GOT MONEY TO BUY MORE.: NEVER TRUE

## 2022-02-22 SDOH — HEALTH STABILITY: PHYSICAL HEALTH: ON AVERAGE, HOW MANY DAYS PER WEEK DO YOU ENGAGE IN MODERATE TO STRENUOUS EXERCISE (LIKE A BRISK WALK)?: 4 DAYS

## 2022-02-22 SDOH — ECONOMIC STABILITY: FOOD INSECURITY: WITHIN THE PAST 12 MONTHS, THE FOOD YOU BOUGHT JUST DIDN'T LAST AND YOU DIDN'T HAVE MONEY TO GET MORE.: NEVER TRUE

## 2022-02-22 SDOH — HEALTH STABILITY: PHYSICAL HEALTH: ON AVERAGE, HOW MANY MINUTES DO YOU ENGAGE IN EXERCISE AT THIS LEVEL?: 40 MIN

## 2022-02-22 SDOH — ECONOMIC STABILITY: INCOME INSECURITY: IN THE LAST 12 MONTHS, WAS THERE A TIME WHEN YOU WERE NOT ABLE TO PAY THE MORTGAGE OR RENT ON TIME?: NO

## 2022-02-22 SDOH — ECONOMIC STABILITY: INCOME INSECURITY: HOW HARD IS IT FOR YOU TO PAY FOR THE VERY BASICS LIKE FOOD, HOUSING, MEDICAL CARE, AND HEATING?: NOT HARD AT ALL

## 2022-02-22 SDOH — HEALTH STABILITY: MENTAL HEALTH
STRESS IS WHEN SOMEONE FEELS TENSE, NERVOUS, ANXIOUS, OR CAN'T SLEEP AT NIGHT BECAUSE THEIR MIND IS TROUBLED. HOW STRESSED ARE YOU?: ONLY A LITTLE

## 2022-02-22 SDOH — ECONOMIC STABILITY: TRANSPORTATION INSECURITY
IN THE PAST 12 MONTHS, HAS LACK OF TRANSPORTATION KEPT YOU FROM MEETINGS, WORK, OR FROM GETTING THINGS NEEDED FOR DAILY LIVING?: NO

## 2022-02-22 ASSESSMENT — SOCIAL DETERMINANTS OF HEALTH (SDOH)
HOW HARD IS IT FOR YOU TO PAY FOR THE VERY BASICS LIKE FOOD, HOUSING, MEDICAL CARE, AND HEATING?: NOT HARD AT ALL
HOW OFTEN DO YOU ATTEND CHURCH OR RELIGIOUS SERVICES?: NEVER
HOW OFTEN DO YOU HAVE SIX OR MORE DRINKS ON ONE OCCASION: MONTHLY
HOW OFTEN DO YOU ATTENT MEETINGS OF THE CLUB OR ORGANIZATION YOU BELONG TO?: NEVER
DO YOU BELONG TO ANY CLUBS OR ORGANIZATIONS SUCH AS CHURCH GROUPS UNIONS, FRATERNAL OR ATHLETIC GROUPS, OR SCHOOL GROUPS?: NO
DO YOU BELONG TO ANY CLUBS OR ORGANIZATIONS SUCH AS CHURCH GROUPS UNIONS, FRATERNAL OR ATHLETIC GROUPS, OR SCHOOL GROUPS?: NO
HOW MANY DRINKS CONTAINING ALCOHOL DO YOU HAVE ON A TYPICAL DAY WHEN YOU ARE DRINKING: 5 OR 6
IN A TYPICAL WEEK, HOW MANY TIMES DO YOU TALK ON THE PHONE WITH FAMILY, FRIENDS, OR NEIGHBORS?: TWICE A WEEK
WITHIN THE PAST 12 MONTHS, YOU WORRIED THAT YOUR FOOD WOULD RUN OUT BEFORE YOU GOT THE MONEY TO BUY MORE: NEVER TRUE
HOW OFTEN DO YOU HAVE A DRINK CONTAINING ALCOHOL: 4 OR MORE TIMES A WEEK
HOW OFTEN DO YOU ATTEND CHURCH OR RELIGIOUS SERVICES?: NEVER
HOW OFTEN DO YOU GET TOGETHER WITH FRIENDS OR RELATIVES?: ONCE A WEEK
HOW OFTEN DO YOU ATTENT MEETINGS OF THE CLUB OR ORGANIZATION YOU BELONG TO?: NEVER
HOW OFTEN DO YOU GET TOGETHER WITH FRIENDS OR RELATIVES?: ONCE A WEEK
IN A TYPICAL WEEK, HOW MANY TIMES DO YOU TALK ON THE PHONE WITH FAMILY, FRIENDS, OR NEIGHBORS?: TWICE A WEEK

## 2022-02-22 ASSESSMENT — LIFESTYLE VARIABLES
HOW OFTEN DO YOU HAVE SIX OR MORE DRINKS ON ONE OCCASION: MONTHLY
HOW MANY STANDARD DRINKS CONTAINING ALCOHOL DO YOU HAVE ON A TYPICAL DAY: 5 OR 6
HOW OFTEN DO YOU HAVE A DRINK CONTAINING ALCOHOL: 4 OR MORE TIMES A WEEK

## 2022-02-24 ENCOUNTER — HOSPITAL ENCOUNTER (OUTPATIENT)
Dept: LAB | Facility: MEDICAL CENTER | Age: 72
End: 2022-02-24
Attending: FAMILY MEDICINE
Payer: MEDICARE

## 2022-02-24 ENCOUNTER — OFFICE VISIT (OUTPATIENT)
Dept: MEDICAL GROUP | Facility: LAB | Age: 72
End: 2022-02-24
Payer: MEDICARE

## 2022-02-24 VITALS
TEMPERATURE: 97.3 F | SYSTOLIC BLOOD PRESSURE: 112 MMHG | BODY MASS INDEX: 29.47 KG/M2 | DIASTOLIC BLOOD PRESSURE: 70 MMHG | HEIGHT: 75 IN | HEART RATE: 76 BPM | WEIGHT: 237 LBS | OXYGEN SATURATION: 98 %

## 2022-02-24 DIAGNOSIS — G47.33 OSA ON CPAP: ICD-10-CM

## 2022-02-24 DIAGNOSIS — K21.9 GASTROESOPHAGEAL REFLUX DISEASE, UNSPECIFIED WHETHER ESOPHAGITIS PRESENT: ICD-10-CM

## 2022-02-24 DIAGNOSIS — I10 ESSENTIAL HYPERTENSION: ICD-10-CM

## 2022-02-24 DIAGNOSIS — R91.1 PULMONARY NODULE: ICD-10-CM

## 2022-02-24 DIAGNOSIS — M54.12 CERVICAL RADICULOPATHY: ICD-10-CM

## 2022-02-24 DIAGNOSIS — Z12.5 ENCOUNTER FOR SCREENING FOR MALIGNANT NEOPLASM OF PROSTATE: ICD-10-CM

## 2022-02-24 DIAGNOSIS — M05.80 POLYARTHRITIS WITH POSITIVE RHEUMATOID FACTOR (HCC): ICD-10-CM

## 2022-02-24 DIAGNOSIS — E78.5 DYSLIPIDEMIA: ICD-10-CM

## 2022-02-24 DIAGNOSIS — Z00.00 ENCOUNTER FOR ANNUAL WELLNESS EXAM IN MEDICARE PATIENT: ICD-10-CM

## 2022-02-24 DIAGNOSIS — H81.13 BENIGN PAROXYSMAL POSITIONAL VERTIGO DUE TO BILATERAL VESTIBULAR DISORDER: ICD-10-CM

## 2022-02-24 DIAGNOSIS — T75.3XXD MOTION SICKNESS, SUBSEQUENT ENCOUNTER: ICD-10-CM

## 2022-02-24 LAB — PSA SERPL-MCNC: 3.17 NG/ML (ref 0–4)

## 2022-02-24 PROCEDURE — 36415 COLL VENOUS BLD VENIPUNCTURE: CPT | Mod: GA

## 2022-02-24 PROCEDURE — 84153 ASSAY OF PSA TOTAL: CPT | Mod: GA

## 2022-02-24 PROCEDURE — G0439 PPPS, SUBSEQ VISIT: HCPCS | Performed by: FAMILY MEDICINE

## 2022-02-24 RX ORDER — SCOLOPAMINE TRANSDERMAL SYSTEM 1 MG/1
1 PATCH, EXTENDED RELEASE TRANSDERMAL
Qty: 4 PATCH | Refills: 3 | Status: SHIPPED | OUTPATIENT
Start: 2022-02-24 | End: 2023-03-08 | Stop reason: SDUPTHER

## 2022-02-24 RX ORDER — LOSARTAN POTASSIUM AND HYDROCHLOROTHIAZIDE 25; 100 MG/1; MG/1
1 TABLET ORAL
Qty: 90 TABLET | Refills: 3 | Status: SHIPPED | OUTPATIENT
Start: 2022-02-24 | End: 2023-03-08 | Stop reason: SDUPTHER

## 2022-02-24 RX ORDER — DICLOFENAC SODIUM 75 MG/1
75 TABLET, DELAYED RELEASE ORAL 2 TIMES DAILY PRN
Qty: 180 TABLET | Refills: 0 | Status: SHIPPED | OUTPATIENT
Start: 2022-02-24 | End: 2022-10-26

## 2022-02-24 RX ORDER — ATORVASTATIN CALCIUM 40 MG/1
40 TABLET, FILM COATED ORAL
Qty: 90 TABLET | Refills: 3 | Status: SHIPPED | OUTPATIENT
Start: 2022-02-24 | End: 2023-03-21

## 2022-02-24 ASSESSMENT — ACTIVITIES OF DAILY LIVING (ADL): BATHING_REQUIRES_ASSISTANCE: 0

## 2022-02-24 ASSESSMENT — PATIENT HEALTH QUESTIONNAIRE - PHQ9: CLINICAL INTERPRETATION OF PHQ2 SCORE: 0

## 2022-02-24 ASSESSMENT — FIBROSIS 4 INDEX: FIB4 SCORE: 1.26

## 2022-02-24 ASSESSMENT — ENCOUNTER SYMPTOMS: GENERAL WELL-BEING: GOOD

## 2022-02-24 NOTE — PROGRESS NOTES
No chief complaint on file.        HPI:  Bolivar is a 71 y.o. here for Medicare Annual Wellness Visit    #Seasickness:  -Patient states that he has a second house in [place].  When he visits he regularly fishes out on seeing a boat.  This does cause some dizziness, nausea due to seasickness.  He has taken Dramamine before with no real improvement.  Here to discuss other possible medications.      Patient Active Problem List    Diagnosis Date Noted   • Cervical radiculopathy 02/23/2021   • Pulmonary nodule 01/15/2020   • Sciatica of right side 01/15/2020   • Complete tear of right rotator cuff 02/13/2019   • Benign paroxysmal positional vertigo due to bilateral vestibular disorder 09/06/2018   • Polyarthritis with positive rheumatoid factor (HCC) 10/25/2017   • Chronic pain of both shoulders 10/24/2017   • Prediabetes 02/15/2017   • GERD (gastroesophageal reflux disease) 02/15/2017   • Esophageal stricture 02/15/2017   • Essential hypertension 08/20/2013   • Dyslipidemia 08/20/2013   • CESAR on CPAP 08/20/2013   • Family history of premature CAD 08/20/2013       Current Outpatient Medications   Medication Sig Dispense Refill   • NON SPECIFIED Take 1 Tablet by mouth 2 times a day.     • diclofenac DR (VOLTAREN) 75 MG Tablet Delayed Response TAKE 1 TABLET TWICE DAILY AS NEEDED FOR PAIN 180 Tablet 0   • atorvastatin (LIPITOR) 40 MG Tab TAKE 1 TABLET EVERY DAY 90 Tablet 0   • losartan-hydrochlorothiazide (HYZAAR) 100-25 MG per tablet TAKE 1 TABLET EVERY DAY 90 Tablet 0   • metoprolol tartrate (LOPRESSOR) 50 MG Tab TAKE 1 TABLET TWICE DAILY 180 tablet 3   • amLODIPine (NORVASC) 5 MG Tab TAKE 1 TABLET EVERY DAY 90 tablet 3   • fluticasone (FLONASE) 50 MCG/ACT nasal spray Spray 2 Sprays in nose every day. 16 g 3   • Misc Natural Products (FIBER 7 PO) Take  by mouth.     • aspirin (ECOTRIN LOW STRENGTH) 81 MG EC tablet Take 81 mg by mouth.     • Cholecalciferol (PA VITAMIN D-3) 2000 UNIT Cap Take 2,000 Units by mouth.       No  current facility-administered medications for this visit.        Patient is taking medications as noted in medication list.  Current supplements as per medication list.     Allergies: Morphine    Current social contact/activities:  Fishing, traveling to home in [place]      Is patient current with immunizations? No, due for SHINGRIX (Shingles). Patient is interested in receiving NONE today.    He  reports that he has never smoked. He has never used smokeless tobacco. He reports current alcohol use of about 3.6 oz of alcohol per week. He reports that he does not use drugs.  Counseling given: Not Answered        DPA/Advanced directive: Patient does not have an Advanced Directive.  A packet and workshop information was given on Advanced Directives.    ROS:    Gait: Uses no assistive device   Ostomy: No   Other tubes: No   Amputations: No   Chronic oxygen use No   Last eye exam  Yearly   Wears hearing aids: No   : Denies any urinary leakage during the last 6 months    Screening:      Depression Screening    Little interest or pleasure in doing things?  0 - not at all  Feeling down, depressed, or hopeless? 0 - not at all  Patient Health Questionnaire Score: 0    If depressive symptoms identified deferred to follow up visit unless specifically addressed in assessment and plan.    Interpretation of PHQ-9 Total Score   Score Severity   1-4 No Depression   5-9 Mild Depression   10-14 Moderate Depression   15-19 Moderately Severe Depression   20-27 Severe Depression    Screening for Cognitive Impairment    Three Minute Recall (captain, garden, picture)  3/3    Tay clock face with all 12 numbers and set the hands to show 5 past 8.  Yes    If cognitive concerns identified, deferred for follow up unless specifically addressed in assessment and plan.    Fall Risk Assessment    Has the patient had two or more falls in the last year or any fall with injury in the last year?  No  If fall risk identified, deferred for follow up  unless specifically addressed in assessment and plan.    Safety Assessment    Throw rugs on floor.  No  Handrails on all stairs.  Yes  Good lighting in all hallways.  Yes  Difficulty hearing.  No  Patient counseled about all safety risks that were identified.    Functional Assessment ADLs    Are there any barriers preventing you from cooking for yourself or meeting nutritional needs?  No.    Are there any barriers preventing you from driving safely or obtaining transportation?  No.    Are there any barriers preventing you from using a telephone or calling for help?  No.    Are there any barriers preventing you from shopping?  No.    Are there any barriers preventing you from taking care of your own finances?  No.    Are there any barriers preventing you from managing your medications?  No.    Are there any barriers preventing you from showering, bathing or dressing yourself?  No.    Are you currently engaging in any exercise or physical activity?  Yes.     What is your perception of your health?  Good.    Health Maintenance Summary          Overdue - IMM ZOSTER VACCINES (1 of 2) Overdue - never done    No completion history exists for this topic.          Overdue - COVID-19 Vaccine (3 - Booster for Pfizer series) Overdue since 7/12/2021 02/12/2021  Imm Admin: Pfizer SARS-CoV-2 Vaccine    01/22/2021  Imm Admin: Pfizer SARS-CoV-2 Vaccine          Overdue - IMM INFLUENZA (1) Overdue since 9/1/2021    10/08/2020  Imm Admin: Influenza Vaccine Quad Inj (Pf)    10/22/2019  Imm Admin: Influenza Vaccine Adult HD    10/31/2018  Imm Admin: Influenza, Unspecified - HISTORICAL DATA    10/17/2018  Imm Admin: Influenza Vaccine Adult HD    02/07/2018  Imm Admin: Influenza Seasonal Injectable - Historical Data    Only the first 5 history entries have been loaded, but more history exists.          Overdue - Annual Wellness Visit (Every 366 Days) Overdue since 2/24/2022 02/23/2021  Visit Dx: Medicare annual wellness visit,  subsequent    02/23/2021  Level of Service: ID ANNUAL WELLNESS VISIT-INCLUDES PPPS SUBSEQUE*    02/18/2020  Subsequent Annual Wellness Visit - Includes PPPS ()    02/18/2020  Visit Dx: Medicare annual wellness visit, subsequent    02/13/2019  Visit Dx: Medicare annual wellness visit, subsequent    Only the first 5 history entries have been loaded, but more history exists.          COLORECTAL CANCER SCREENING (COLOGUARD STOOL DNA - Every 3 Years) Due soon on 3/11/2022    03/11/2019  COLOGUARD COLON CANCER SCREENING          IMM DTaP/Tdap/Td Vaccine (2 - Td or Tdap) Next due on 2/15/2027    02/15/2017  Imm Admin: Tdap Vaccine          IMM PNEUMOCOCCAL VACCINE: 65+ Years (Series Information) Completed    11/23/2016  Imm Admin: Pneumococcal polysaccharide vaccine (PPSV-23)    11/09/2016  Imm Admin: Pneumococcal polysaccharide vaccine (PPSV-23)    12/17/2015  Imm Admin: Pneumococcal polysaccharide vaccine (PPSV-23)    11/06/2015  Imm Admin: Pneumococcal Conjugate Vaccine (Prevnar/PCV-13)          IMM HEP B VACCINE (Series Information) Aged Out    No completion history exists for this topic.          IMM MENINGOCOCCAL VACCINE (MCV4) (Series Information) Aged Out    No completion history exists for this topic.          Discontinued - HEPATITIS C SCREENING  Discontinued    No completion history exists for this topic.              Patient Care Team:  Nona Mcmahan M.D. as PCP - General (Family Medicine)  Will Ruiz M.D. as Consulting Physician (Cardiovascular Disease (Cardiology))  Heidy Jefferson D.P.M. as Consulting Physician (Podiatry)    Social History     Tobacco Use   • Smoking status: Never Smoker   • Smokeless tobacco: Never Used   Vaping Use   • Vaping Use: Never used   Substance Use Topics   • Alcohol use: Yes     Alcohol/week: 3.6 oz     Types: 6 Cans of beer per week   • Drug use: No     Family History   Problem Relation Age of Onset   • Heart Attack Brother    • Hypertension Brother    • Stroke  "Brother    • Arthritis Mother    • Heart Disease Mother    • Hypertension Mother    • Heart Disease Father    • Hypertension Father         heart attack     He  has a past medical history of GERD (gastroesophageal reflux disease) (2/15/2017), Hyperlipidemia, Hypertension, Prediabetes (2/15/2017), Sleep apnea, and Tear of lateral cartilage or meniscus of knee, current (7/25/2012).   Past Surgical History:   Procedure Laterality Date   • APPENDECTOMY     • HERNIA REPAIR     • KNEE ARTHROSCOPY Bilateral     4 total       Exam:     /70   Pulse 76   Temp 36.3 °C (97.3 °F) (Temporal)   Ht 1.905 m (6' 3\")   Wt 108 kg (237 lb)   SpO2 98%  Body mass index is 29.62 kg/m².    Hearing excellent.    Dentition good  Alert, oriented in no acute distress.  Eye contact is good, speech goal directed, affect calm      Assessment and Plan. The following treatment and monitoring plan is recommended:      1. Encounter for annual wellness exam in Medicare patient  -Reviewed all screenings, vaccinations needed.  Patient will follow-up for zoster vaccine.  Patient will complete PSA today.  - Subsequent Annual Wellness Visit - Includes PPPS ()    2. 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  Continues with Epley maneuvers as needed.  No concerns.    3. Cervical radiculopathy  -Chronic condition, stable.  Continue treating with diclofenac.    4. Dyslipidemia  -Continue with lifestyle modification.  Will refill Lipitor at this time.  Reviewed labs, cholesterol is at goal.  - atorvastatin (LIPITOR) 40 MG Tab; Take 1 Tablet by mouth every day.  Dispense: 90 Tablet; Refill: 3    5. Essential hypertension  -Stable.  Continue lifestyle modification.  Blood pressure at goal, will refill losartan-hydrochlorothiazide.  - losartan-hydrochlorothiazide (HYZAAR) 100-25 MG per tablet; Take 1 Tablet by mouth every day.  Dispense: 90 Tablet; Refill: 3    6. Gastroesophageal reflux disease, unspecified " whether esophagitis present  -Lifestyle modification, discussed appropriate food behavior.    7. Polyarthritis with positive rheumatoid factor (HCC)  -Stable.  No concerns.  Will refill and continue treat with diclofenac.  - diclofenac DR (VOLTAREN) 75 MG Tablet Delayed Response; Take 1 Tablet by mouth 2 times a day as needed. for pain  Dispense: 180 Tablet; Refill: 0    8. CESAR on CPAP  -Stable.  Compliant with CPAP.  Continue with its use.    9. Pulmonary nodule  -Originally seen on x-ray completed in 2000.  Subsequent CT scan does show no concerning findings at this time.    10. Encounter for screening for malignant neoplasm of prostate  - PROSTATE SPECIFIC AG SCREENING; Future    11. Motion sickness, subsequent encounter  -We will begin treatment of motion sickness with scopolamine patches.  Discussed possible side effects.  Patient will follow-up as needed.  - scopolamine (TRANSDERM-SCOP, 1.5 MG,) 1 mg/72hr PATCH 72 HR; Place 1 Patch on the skin every 72 hours.  Dispense: 4 Patch; Refill: 3      Services suggested: No services needed at this time  Health Care Screening recommendations as per orders if indicated.  Referrals offered: PT/OT/Nutrition counseling/Behavioral Health/Smoking cessation as per orders if indicated.    Discussion today about general wellness and lifestyle habits:    · Prevent falls and reduce trip hazards; Cautioned about securing or removing rugs.  · Have a working fire alarm and carbon monoxide detector;   · Engage in regular physical activity and social activities       Follow-up: No follow-ups on file.

## 2022-09-21 ENCOUNTER — NON-PROVIDER VISIT (OUTPATIENT)
Dept: MEDICAL GROUP | Facility: LAB | Age: 72
End: 2022-09-21
Payer: MEDICARE

## 2022-09-21 ENCOUNTER — HOSPITAL ENCOUNTER (OUTPATIENT)
Dept: LAB | Facility: MEDICAL CENTER | Age: 72
End: 2022-09-21
Attending: INTERNAL MEDICINE
Payer: MEDICARE

## 2022-09-21 DIAGNOSIS — Z23 NEED FOR VACCINATION: ICD-10-CM

## 2022-09-21 DIAGNOSIS — E78.5 DYSLIPIDEMIA: ICD-10-CM

## 2022-09-21 LAB
ALBUMIN SERPL BCP-MCNC: 4.5 G/DL (ref 3.2–4.9)
ALBUMIN/GLOB SERPL: 1.7 G/DL
ALP SERPL-CCNC: 85 U/L (ref 30–99)
ALT SERPL-CCNC: 39 U/L (ref 2–50)
ANION GAP SERPL CALC-SCNC: 12 MMOL/L (ref 7–16)
AST SERPL-CCNC: 30 U/L (ref 12–45)
BILIRUB SERPL-MCNC: 1.6 MG/DL (ref 0.1–1.5)
BUN SERPL-MCNC: 21 MG/DL (ref 8–22)
CALCIUM SERPL-MCNC: 9.1 MG/DL (ref 8.5–10.5)
CHLORIDE SERPL-SCNC: 105 MMOL/L (ref 96–112)
CHOLEST SERPL-MCNC: 134 MG/DL (ref 100–199)
CO2 SERPL-SCNC: 24 MMOL/L (ref 20–33)
CREAT SERPL-MCNC: 0.77 MG/DL (ref 0.5–1.4)
GFR SERPLBLD CREATININE-BSD FMLA CKD-EPI: 94 ML/MIN/1.73 M 2
GLOBULIN SER CALC-MCNC: 2.6 G/DL (ref 1.9–3.5)
GLUCOSE SERPL-MCNC: 95 MG/DL (ref 65–99)
HDLC SERPL-MCNC: 38 MG/DL
LDLC SERPL CALC-MCNC: 75 MG/DL
POTASSIUM SERPL-SCNC: 3.7 MMOL/L (ref 3.6–5.5)
PROT SERPL-MCNC: 7.1 G/DL (ref 6–8.2)
SODIUM SERPL-SCNC: 141 MMOL/L (ref 135–145)
TRIGL SERPL-MCNC: 105 MG/DL (ref 0–149)

## 2022-09-21 PROCEDURE — G0008 ADMIN INFLUENZA VIRUS VAC: HCPCS | Performed by: PHYSICIAN ASSISTANT

## 2022-09-21 PROCEDURE — 80053 COMPREHEN METABOLIC PANEL: CPT

## 2022-09-21 PROCEDURE — 80061 LIPID PANEL: CPT

## 2022-09-21 PROCEDURE — 90662 IIV NO PRSV INCREASED AG IM: CPT | Performed by: PHYSICIAN ASSISTANT

## 2022-09-21 PROCEDURE — 36415 COLL VENOUS BLD VENIPUNCTURE: CPT

## 2022-09-21 NOTE — PROGRESS NOTES
"Bolivar Sykes is a 72 y.o. male here for a non-provider visit for:   FLU    Reason for immunization: Annual Flu Vaccine  Immunization records indicate need for vaccine: Yes, confirmed with Epic  Minimum interval has been met for this vaccine: Yes  ABN completed: Yes    VIS Dated  8/6/2021 was given to patient: Yes  All IAC Questionnaire questions were answered \"No.\"    Patient tolerated injection and no adverse effects were observed or reported: Yes    Pt scheduled for next dose in series: Not Indicated  "

## 2022-10-26 DIAGNOSIS — M05.80 POLYARTHRITIS WITH POSITIVE RHEUMATOID FACTOR (HCC): ICD-10-CM

## 2022-10-26 RX ORDER — DICLOFENAC SODIUM 75 MG/1
TABLET, DELAYED RELEASE ORAL
Qty: 180 TABLET | Refills: 0 | Status: SHIPPED | OUTPATIENT
Start: 2022-10-26 | End: 2023-03-08 | Stop reason: SDUPTHER

## 2022-11-04 ENCOUNTER — PATIENT MESSAGE (OUTPATIENT)
Dept: HEALTH INFORMATION MANAGEMENT | Facility: OTHER | Age: 72
End: 2022-11-04

## 2023-02-21 ENCOUNTER — TELEPHONE (OUTPATIENT)
Dept: MEDICAL GROUP | Facility: LAB | Age: 73
End: 2023-02-21
Payer: MEDICARE

## 2023-02-21 NOTE — TELEPHONE ENCOUNTER
ANNUAL WELLNESS VISIT PRE-VISIT PLANNING    1.  Reviewed notes from the last office visit: Yes    2.  If any orders were ordered or intended to be done prior to visit (i.e. 6 mos follow-up), do we have results/consult notes or has patient scheduled?          Labs - Labs ordered, completed on 2/24/22 and results are in chart.  Note: If patient appointment is for lab review and patient did not complete labs, check with provider if OK to reschedule patient until labs completed.         Imaging - Imaging was not ordered at last office visit.         Referrals - No referrals were ordered at last office visit.    3.  Immunizations were updated in Epic using Reconcile Outside Information activity? Yes  4.  Patient is due for the following Health Maintenance Topics:   Health Maintenance Due   Topic Date Due    IMM ZOSTER VACCINES (1 of 2) Never done    COLORECTAL CANCER SCREENING  03/11/2022   5.  Reviewed/Updated the following with patient:          Preferred Pharmacy? Yes          Preferred Lab? Yes          Preferred Communication? Yes          Allergies? Yes          Medications? YES. Was Abstract Encounter opened and chart updated? YES   6.  Care Team Updated:          DME Company (gait device, O2, CPAP, etc.): YES          Other Specialists (eye doctor, derm, GYN, cardiology, endo, etc): YES    7.  Patient was advised: “This is a free wellness visit. The provider will screen for medical conditions to help you stay healthy. If you have other concerns to address you may be asked to discuss these at a separate visit or there may be an additional fee.”     8.  AHA (Puls8) form printed for Provider? N/A

## 2023-03-07 SDOH — ECONOMIC STABILITY: FOOD INSECURITY: WITHIN THE PAST 12 MONTHS, THE FOOD YOU BOUGHT JUST DIDN'T LAST AND YOU DIDN'T HAVE MONEY TO GET MORE.: NEVER TRUE

## 2023-03-07 SDOH — HEALTH STABILITY: PHYSICAL HEALTH: ON AVERAGE, HOW MANY DAYS PER WEEK DO YOU ENGAGE IN MODERATE TO STRENUOUS EXERCISE (LIKE A BRISK WALK)?: 4 DAYS

## 2023-03-07 SDOH — ECONOMIC STABILITY: INCOME INSECURITY: IN THE LAST 12 MONTHS, WAS THERE A TIME WHEN YOU WERE NOT ABLE TO PAY THE MORTGAGE OR RENT ON TIME?: NO

## 2023-03-07 SDOH — HEALTH STABILITY: PHYSICAL HEALTH: ON AVERAGE, HOW MANY MINUTES DO YOU ENGAGE IN EXERCISE AT THIS LEVEL?: 50 MIN

## 2023-03-07 SDOH — ECONOMIC STABILITY: FOOD INSECURITY: WITHIN THE PAST 12 MONTHS, YOU WORRIED THAT YOUR FOOD WOULD RUN OUT BEFORE YOU GOT MONEY TO BUY MORE.: NEVER TRUE

## 2023-03-07 SDOH — ECONOMIC STABILITY: INCOME INSECURITY: HOW HARD IS IT FOR YOU TO PAY FOR THE VERY BASICS LIKE FOOD, HOUSING, MEDICAL CARE, AND HEATING?: NOT HARD AT ALL

## 2023-03-07 SDOH — ECONOMIC STABILITY: HOUSING INSECURITY: IN THE LAST 12 MONTHS, HOW MANY PLACES HAVE YOU LIVED?: 2

## 2023-03-07 ASSESSMENT — SOCIAL DETERMINANTS OF HEALTH (SDOH)
DO YOU BELONG TO ANY CLUBS OR ORGANIZATIONS SUCH AS CHURCH GROUPS UNIONS, FRATERNAL OR ATHLETIC GROUPS, OR SCHOOL GROUPS?: NO
HOW OFTEN DO YOU ATTEND CHURCH OR RELIGIOUS SERVICES?: 1 TO 4 TIMES PER YEAR
HOW OFTEN DO YOU ATTENT MEETINGS OF THE CLUB OR ORGANIZATION YOU BELONG TO?: NEVER
HOW OFTEN DO YOU ATTENT MEETINGS OF THE CLUB OR ORGANIZATION YOU BELONG TO?: NEVER
HOW OFTEN DO YOU GET TOGETHER WITH FRIENDS OR RELATIVES?: ONCE A WEEK
HOW OFTEN DO YOU HAVE SIX OR MORE DRINKS ON ONE OCCASION: LESS THAN MONTHLY
DO YOU BELONG TO ANY CLUBS OR ORGANIZATIONS SUCH AS CHURCH GROUPS UNIONS, FRATERNAL OR ATHLETIC GROUPS, OR SCHOOL GROUPS?: NO
IN A TYPICAL WEEK, HOW MANY TIMES DO YOU TALK ON THE PHONE WITH FAMILY, FRIENDS, OR NEIGHBORS?: TWICE A WEEK
HOW OFTEN DO YOU HAVE A DRINK CONTAINING ALCOHOL: 4 OR MORE TIMES A WEEK
HOW MANY DRINKS CONTAINING ALCOHOL DO YOU HAVE ON A TYPICAL DAY WHEN YOU ARE DRINKING: 5 OR 6
HOW OFTEN DO YOU GET TOGETHER WITH FRIENDS OR RELATIVES?: ONCE A WEEK
HOW OFTEN DO YOU ATTEND CHURCH OR RELIGIOUS SERVICES?: 1 TO 4 TIMES PER YEAR
HOW HARD IS IT FOR YOU TO PAY FOR THE VERY BASICS LIKE FOOD, HOUSING, MEDICAL CARE, AND HEATING?: NOT HARD AT ALL
WITHIN THE PAST 12 MONTHS, YOU WORRIED THAT YOUR FOOD WOULD RUN OUT BEFORE YOU GOT THE MONEY TO BUY MORE: NEVER TRUE
IN A TYPICAL WEEK, HOW MANY TIMES DO YOU TALK ON THE PHONE WITH FAMILY, FRIENDS, OR NEIGHBORS?: TWICE A WEEK

## 2023-03-07 ASSESSMENT — LIFESTYLE VARIABLES
SKIP TO QUESTIONS 9-10: 0
AUDIT-C TOTAL SCORE: 7
HOW OFTEN DO YOU HAVE SIX OR MORE DRINKS ON ONE OCCASION: LESS THAN MONTHLY
HOW OFTEN DO YOU HAVE A DRINK CONTAINING ALCOHOL: 4 OR MORE TIMES A WEEK
HOW MANY STANDARD DRINKS CONTAINING ALCOHOL DO YOU HAVE ON A TYPICAL DAY: 5 OR 6

## 2023-03-08 ENCOUNTER — OFFICE VISIT (OUTPATIENT)
Dept: MEDICAL GROUP | Facility: LAB | Age: 73
End: 2023-03-08
Payer: MEDICARE

## 2023-03-08 VITALS
HEART RATE: 58 BPM | HEIGHT: 74 IN | DIASTOLIC BLOOD PRESSURE: 60 MMHG | SYSTOLIC BLOOD PRESSURE: 140 MMHG | BODY MASS INDEX: 30.8 KG/M2 | TEMPERATURE: 97.6 F | WEIGHT: 240 LBS | OXYGEN SATURATION: 98 %

## 2023-03-08 DIAGNOSIS — Z12.11 SCREENING FOR COLORECTAL CANCER: ICD-10-CM

## 2023-03-08 DIAGNOSIS — I10 ESSENTIAL HYPERTENSION: ICD-10-CM

## 2023-03-08 DIAGNOSIS — R73.03 PREDIABETES: ICD-10-CM

## 2023-03-08 DIAGNOSIS — T75.3XXD MOTION SICKNESS, SUBSEQUENT ENCOUNTER: ICD-10-CM

## 2023-03-08 DIAGNOSIS — M54.31 SCIATICA OF RIGHT SIDE: ICD-10-CM

## 2023-03-08 DIAGNOSIS — Z00.00 ENCOUNTER FOR ANNUAL WELLNESS EXAM IN MEDICARE PATIENT: ICD-10-CM

## 2023-03-08 DIAGNOSIS — G47.33 OSA ON CPAP: ICD-10-CM

## 2023-03-08 DIAGNOSIS — M05.80 POLYARTHRITIS WITH POSITIVE RHEUMATOID FACTOR (HCC): ICD-10-CM

## 2023-03-08 DIAGNOSIS — K22.2 ESOPHAGEAL STRICTURE: ICD-10-CM

## 2023-03-08 DIAGNOSIS — K21.9 GASTROESOPHAGEAL REFLUX DISEASE, UNSPECIFIED WHETHER ESOPHAGITIS PRESENT: ICD-10-CM

## 2023-03-08 DIAGNOSIS — Z12.12 SCREENING FOR COLORECTAL CANCER: ICD-10-CM

## 2023-03-08 DIAGNOSIS — Z12.5 ENCOUNTER FOR SCREENING FOR MALIGNANT NEOPLASM OF PROSTATE: ICD-10-CM

## 2023-03-08 DIAGNOSIS — E78.5 DYSLIPIDEMIA: ICD-10-CM

## 2023-03-08 PROCEDURE — G0439 PPPS, SUBSEQ VISIT: HCPCS | Performed by: FAMILY MEDICINE

## 2023-03-08 RX ORDER — METOPROLOL TARTRATE 50 MG/1
50 TABLET, FILM COATED ORAL 2 TIMES DAILY
Qty: 180 TABLET | Refills: 3 | Status: SHIPPED | OUTPATIENT
Start: 2023-03-08 | End: 2024-03-02

## 2023-03-08 RX ORDER — LOSARTAN POTASSIUM AND HYDROCHLOROTHIAZIDE 25; 100 MG/1; MG/1
1 TABLET ORAL
Qty: 90 TABLET | Refills: 3 | Status: SHIPPED | OUTPATIENT
Start: 2023-03-08

## 2023-03-08 RX ORDER — AMLODIPINE BESYLATE 5 MG/1
5 TABLET ORAL
Qty: 90 TABLET | Refills: 3 | Status: SHIPPED | OUTPATIENT
Start: 2023-03-08 | End: 2024-03-02

## 2023-03-08 RX ORDER — DICLOFENAC SODIUM 75 MG/1
75 TABLET, DELAYED RELEASE ORAL 2 TIMES DAILY
Qty: 180 TABLET | Refills: 3 | Status: SHIPPED | OUTPATIENT
Start: 2023-03-08 | End: 2024-02-09

## 2023-03-08 RX ORDER — SCOLOPAMINE TRANSDERMAL SYSTEM 1 MG/1
1 PATCH, EXTENDED RELEASE TRANSDERMAL
Qty: 1 PATCH | Refills: 3 | Status: SHIPPED | OUTPATIENT
Start: 2023-03-08

## 2023-03-08 ASSESSMENT — ENCOUNTER SYMPTOMS: GENERAL WELL-BEING: FAIR

## 2023-03-08 ASSESSMENT — FIBROSIS 4 INDEX: FIB4 SCORE: 1.4

## 2023-03-08 ASSESSMENT — ACTIVITIES OF DAILY LIVING (ADL): BATHING_REQUIRES_ASSISTANCE: 0

## 2023-03-08 ASSESSMENT — PATIENT HEALTH QUESTIONNAIRE - PHQ9: CLINICAL INTERPRETATION OF PHQ2 SCORE: 0

## 2023-03-08 NOTE — PROGRESS NOTES
Chief Complaint   Patient presents with    Annual Exam       HPI:  Bolivar is a 72 y.o. here for Medicare Annual Wellness Visit      Patient Active Problem List    Diagnosis Date Noted    Cervical radiculopathy 02/23/2021    Sciatica of right side 01/15/2020    Complete tear of right rotator cuff 02/13/2019    Benign paroxysmal positional vertigo due to bilateral vestibular disorder 09/06/2018    Polyarthritis with positive rheumatoid factor (HCC) 10/25/2017    Chronic pain of both shoulders 10/24/2017    Prediabetes 02/15/2017    GERD (gastroesophageal reflux disease) 02/15/2017    Esophageal stricture 02/15/2017    Essential hypertension 08/20/2013    Dyslipidemia 08/20/2013    CESAR on CPAP 08/20/2013    Family history of premature CAD 08/20/2013       Current Outpatient Medications   Medication Sig Dispense Refill    diclofenac DR (VOLTAREN) 75 MG Tablet Delayed Response TAKE 1 TABLET TWICE DAILY AS NEEDED FOR PAIN 180 Tablet 0    amLODIPine (NORVASC) 5 MG Tab TAKE 1 TABLET EVERY DAY 90 Tablet 3    metoprolol tartrate (LOPRESSOR) 50 MG Tab TAKE 1 TABLET TWICE DAILY 180 Tablet 3    NON SPECIFIED Take 1 Tablet by mouth 2 times a day. (Patient not taking: Reported on 2/21/2023)      losartan-hydrochlorothiazide (HYZAAR) 100-25 MG per tablet Take 1 Tablet by mouth every day. 90 Tablet 3    atorvastatin (LIPITOR) 40 MG Tab Take 1 Tablet by mouth every day. 90 Tablet 3    scopolamine (TRANSDERM-SCOP, 1.5 MG,) 1 mg/72hr PATCH 72 HR Place 1 Patch on the skin every 72 hours. (Patient not taking: Reported on 2/21/2023) 4 Patch 3    fluticasone (FLONASE) 50 MCG/ACT nasal spray Spray 2 Sprays in nose every day. 16 g 3    Misc Natural Products (FIBER 7 PO) Take  by mouth.      aspirin 81 MG EC tablet Take 81 mg by mouth.      Cholecalciferol 2000 UNIT Cap Take 2,000 Units by mouth.       No current facility-administered medications for this visit.        Patient is taking medications as noted in medication list.  Current  supplements as per medication list.     Allergies: Morphine    Current social contact/activities:  Traveling, exercise, socializing with friends and family.     Is patient current with immunizations? No, due for SHINGRIX (Shingles). Patient is interested in receiving NONE today.    He  reports that he has never smoked. He has never used smokeless tobacco. He reports current alcohol use of about 3.6 oz per week. He reports that he does not use drugs.  Counseling given: Not Answered      ROS:    Gait: Uses no assistive device   Ostomy: No   Other tubes: No   Amputations: No   Chronic oxygen use No   Last eye exam Yearly    Wears hearing aids: No   : Denies any urinary leakage during the last 6 months    Screening:    Depression Screening  Little interest or pleasure in doing things?  0 - not at all  Feeling down, depressed, or hopeless? 0 - not at all  Patient Health Questionnaire Score: 0    If depressive symptoms identified deferred to follow up visit unless specifically addressed in assessment and plan.    Interpretation of PHQ-9 Total Score   Score Severity   1-4 No Depression   5-9 Mild Depression   10-14 Moderate Depression   15-19 Moderately Severe Depression   20-27 Severe Depression    Screening for Cognitive Impairment  Three Minute Recall (daughter, heaven, mountain)  3/3    Tay clock face with all 12 numbers and set the hands to show 10 past 11.  Yes    If cognitive concerns identified, deferred for follow up unless specifically addressed in assessment and plan.    Fall Risk Assessment  Has the patient had two or more falls in the last year or any fall with injury in the last year?  No  If fall risk identified, deferred for follow up unless specifically addressed in assessment and plan.    Safety Assessment  Throw rugs on floor.  Yes  Handrails on all stairs.  Yes  Good lighting in all hallways.  Yes  Difficulty hearing.  No  Patient counseled about all safety risks that were identified.    Functional  Assessment ADLs  Are there any barriers preventing you from cooking for yourself or meeting nutritional needs?  No.    Are there any barriers preventing you from driving safely or obtaining transportation?  No.    Are there any barriers preventing you from using a telephone or calling for help?  No.    Are there any barriers preventing you from shopping?  No.    Are there any barriers preventing you from taking care of your own finances?  No.    Are there any barriers preventing you from managing your medications?  No.    Are there any barriers preventing you from showering, bathing or dressing yourself?  No.    Are you currently engaging in any exercise or physical activity?  Yes.     What is your perception of your health?  Fair.    Advance Care Planning  Do you have an Advance Directive, Living Will, Durable Power of , or POLST? No               Health Maintenance Summary            Overdue - IMM ZOSTER VACCINES (1 of 2) Overdue - never done      No completion history exists for this topic.              Overdue - COLORECTAL CANCER SCREENING (COLOGUARD STOOL DNA - Every 3 Years) Overdue since 3/11/2022      03/11/2019  COLOGUARD COLON CANCER SCREENING              Overdue - Annual Wellness Visit (Every 366 Days) Overdue since 2/25/2023 02/24/2022  Subsequent Annual Wellness Visit - Includes PPPS ()    02/23/2021  Level of Service: [place] ANNUAL WELLNESS VISIT-INCLUDES PPPS SUBSEQUE*    02/23/2021  Visit Dx: Medicare annual wellness visit, subsequent    02/18/2020  Subsequent Annual Wellness Visit - Includes PPPS ()    02/18/2020  Visit Dx: Medicare annual wellness visit, subsequent    Only the first 5 history entries have been loaded, but more history exists.              IMM DTaP/Tdap/Td Vaccine (2 - Td or Tdap) Next due on 2/15/2027      02/15/2017  Imm Admin: Tdap Vaccine              IMM PNEUMOCOCCAL VACCINE: 65+ Years (Series Information) Completed      11/23/2016  Imm Admin: Pneumococcal  polysaccharide vaccine (PPSV-23)    11/09/2016  Imm Admin: Pneumococcal polysaccharide vaccine (PPSV-23)    12/17/2015  Imm Admin: Pneumococcal polysaccharide vaccine (PPSV-23)    11/06/2015  Imm Admin: Pneumococcal Conjugate Vaccine (Prevnar/PCV-13)              IMM INFLUENZA (Series Information) Completed      09/21/2022  Imm Admin: Influenza Vaccine Adult HD    11/23/2021  Imm Admin: Influenza Seasonal Injectable - Historical Data    10/08/2020  Imm Admin: Influenza Vaccine Quad Inj (Pf)    10/22/2019  Imm Admin: Influenza Vaccine Adult HD    10/31/2018  Imm Admin: Influenza, Unspecified - HISTORICAL DATA    Only the first 5 history entries have been loaded, but more history exists.              COVID-19 Vaccine (Series Information) Completed      10/11/2022  Imm Admin: PFIZER BIVALENT BOOSTER SARS-COV-2 VACCINE (12+)    02/12/2021  Imm Admin: PFIZER PURPLE CAP SARS-COV-2 VACCINATION (12+)    01/22/2021  Imm Admin: PFIZER PURPLE CAP SARS-COV-2 VACCINATION (12+)              IMM HEP B VACCINE (Series Information) Aged Out      No completion history exists for this topic.              IMM MENINGOCOCCAL ACWY VACCINE (Series Information) Aged Out      No completion history exists for this topic.              Discontinued - HEPATITIS C SCREENING  Discontinued      No completion history exists for this topic.                    Patient Care Team:  Nona Mcmahan M.D. as PCP - General (Family Medicine)  Will Ruiz M.D. as Consulting Physician (Cardiovascular Disease (Cardiology))    Social History     Tobacco Use    Smoking status: Never    Smokeless tobacco: Never   Vaping Use    Vaping Use: Never used   Substance Use Topics    Alcohol use: Yes     Alcohol/week: 3.6 oz     Types: 6 Cans of beer per week    Drug use: No     Family History   Problem Relation Age of Onset    Heart Attack Brother     Hypertension Brother     Stroke Brother     Arthritis Mother     Heart Disease Mother     Hypertension Mother     Heart  "Disease Father     Hypertension Father         heart attack     He  has a past medical history of GERD (gastroesophageal reflux disease) (2/15/2017), Hyperlipidemia, Hypertension, Prediabetes (2/15/2017), Sleep apnea, and Tear of lateral cartilage or meniscus of knee, current (7/25/2012).   Past Surgical History:   Procedure Laterality Date    APPENDECTOMY      HERNIA REPAIR      KNEE ARTHROSCOPY Bilateral     4 total       Exam:   BP (!) 140/60 (BP Location: Left arm, Patient Position: Sitting, BP Cuff Size: Large adult)   Pulse (!) 58   Temp 36.4 °C (97.6 °F) (Temporal)   Ht 1.88 m (6' 2\")   Wt 109 kg (240 lb)   SpO2 98%  Body mass index is 30.81 kg/m².    Hearing excellent.    Dentition good  Alert, oriented in no acute distress.  Eye contact is good, speech goal directed, affect calm      Assessment and Plan. The following treatment and monitoring plan is recommended:     1. Encounter for annual wellness exam in Medicare patient  -Reviewed all labs, screenings, vaccinations.  Recommend follow-up for zoster vaccine.  Labs due as below.  Routine anticipatory guidance given, patient will follow-up for yearly annual wellness visit.  - Subsequent Annual Wellness Visit - Includes PPPS ()    2. Essential hypertension  -This problem is chronic, stable, and monitored appropriately by history/labs/imaging as needed, and is well-controlled on the current regimen.  Please continue current regimen: Continue the metoprolol, amlodipine, losartan-hydrochlorothiazide.  Continue the proper diet and exercise regiment.  - Comp Metabolic Panel; Future  - metoprolol tartrate (LOPRESSOR) 50 MG Tab; Take 1 Tablet by mouth 2 times a day for 360 days.  Dispense: 180 Tablet; Refill: 3  - amLODIPine (NORVASC) 5 MG Tab; Take 1 Tablet by mouth every day for 360 days.  Dispense: 90 Tablet; Refill: 3    3. Dyslipidemia  -This problem is chronic, stable, and monitored appropriately by history/labs/imaging as needed, and is " well-controlled on the current regimen.  Please continue current regimen: Continue with Lipitor 40 mg daily.  Labs reviewed.  Cholesterol is at goal.  Repeat lipid profile at this time.  - Lipid Profile; Future    4. Polyarthritis with positive rheumatoid factor (HCC)  -This problem is chronic, stable, and monitored appropriately by history/labs/imaging as needed, and is well-controlled on the current regimen.  Please continue current regimen: Continue with diclofenac.  Continue with physical therapy, appropriate exercise regiment.  - diclofenac DR (VOLTAREN) 75 MG Tablet Delayed Response; Take 1 Tablet by mouth 2 times a day for 360 days. for pain  Dispense: 180 Tablet; Refill: 3    5. Sciatica of right side  -This problem is chronic, stable, and monitored appropriately by history/labs/imaging as needed, and is well-controlled on the current regimen.  Please continue current regimen: Continue with diclofenac, exercise regimen as above.    6. Prediabetes  -This problem is chronic, stable, and monitored appropriately by history/labs/imaging as needed, and is well-controlled on the current regimen.  Please continue current regimen: Continue with appropriate diet and exercise regimen.  We will continue to checking fasting blood glucose every 6 to 12 months.    7. CESAR on CPAP  -This problem is chronic, stable, and monitored appropriately by history/labs/imaging as needed, and is well-controlled on the current regimen.  Please continue current regimen: Continue with CPAP nightly.    8. Gastroesophageal reflux disease, unspecified whether esophagitis present  -This problem is chronic, stable, and monitored appropriately by history/labs/imaging as needed, and is well-controlled on the current regimen.  Please continue current regimen: Continue with over-the-counter antacids, appropriate diet    9. Esophageal stricture  -This problem is chronic, stable, and monitored appropriately by history/labs/imaging as needed, and is  well-controlled on the current regimen.  Please continue current regimen: Continue monitoring symptoms, follow-up with GI as needed.    10. Motion sickness, subsequent encounter  -This problem is chronic, stable, and monitored appropriately by history/labs/imaging as needed, and is well-controlled on the current regimen.  Please continue current regimen  Continue use of scopolamine patches when needed.  - scopolamine (TRANSDERM-SCOP, 1.5 MG,) 1 mg/72hr PATCH 72 HR; Place 1 Patch on the skin every 72 hours.  Dispense: 1 Patch; Refill: 3    11. Encounter for screening for malignant neoplasm of prostate  - PROSTATE SPECIFIC AG SCREENING; Future    12. Screening for colorectal cancer  - COLOGUARD (FIT DNA)    Services suggested: No services needed at this time  Health Care Screening recommendations as per orders if indicated.  Referrals offered: PT/OT/Nutrition counseling/Behavioral Health/Smoking cessation as per orders if indicated.    Discussion today about general wellness and lifestyle habits:    Prevent falls and reduce trip hazards; Cautioned about securing or removing rugs.  Have a working fire alarm and carbon monoxide detector;   Engage in regular physical activity and social activities     Follow-up: No follow-ups on file.

## 2023-03-16 ENCOUNTER — HOSPITAL ENCOUNTER (OUTPATIENT)
Dept: LAB | Facility: MEDICAL CENTER | Age: 73
End: 2023-03-16
Attending: FAMILY MEDICINE
Payer: MEDICARE

## 2023-03-16 DIAGNOSIS — Z12.5 ENCOUNTER FOR SCREENING FOR MALIGNANT NEOPLASM OF PROSTATE: ICD-10-CM

## 2023-03-16 DIAGNOSIS — E78.5 DYSLIPIDEMIA: ICD-10-CM

## 2023-03-16 DIAGNOSIS — I10 ESSENTIAL HYPERTENSION: ICD-10-CM

## 2023-03-16 LAB
ALBUMIN SERPL BCP-MCNC: 4.6 G/DL (ref 3.2–4.9)
ALBUMIN/GLOB SERPL: 1.6 G/DL
ALP SERPL-CCNC: 87 U/L (ref 30–99)
ALT SERPL-CCNC: 64 U/L (ref 2–50)
ANION GAP SERPL CALC-SCNC: 11 MMOL/L (ref 7–16)
AST SERPL-CCNC: 40 U/L (ref 12–45)
BILIRUB SERPL-MCNC: 1.2 MG/DL (ref 0.1–1.5)
BUN SERPL-MCNC: 23 MG/DL (ref 8–22)
CALCIUM ALBUM COR SERPL-MCNC: 9.2 MG/DL (ref 8.5–10.5)
CALCIUM SERPL-MCNC: 9.7 MG/DL (ref 8.5–10.5)
CHLORIDE SERPL-SCNC: 102 MMOL/L (ref 96–112)
CHOLEST SERPL-MCNC: 153 MG/DL (ref 100–199)
CO2 SERPL-SCNC: 25 MMOL/L (ref 20–33)
CREAT SERPL-MCNC: 0.75 MG/DL (ref 0.5–1.4)
GFR SERPLBLD CREATININE-BSD FMLA CKD-EPI: 95 ML/MIN/1.73 M 2
GLOBULIN SER CALC-MCNC: 2.9 G/DL (ref 1.9–3.5)
GLUCOSE SERPL-MCNC: 110 MG/DL (ref 65–99)
HDLC SERPL-MCNC: 39 MG/DL
LDLC SERPL CALC-MCNC: 91 MG/DL
POTASSIUM SERPL-SCNC: 4 MMOL/L (ref 3.6–5.5)
PROT SERPL-MCNC: 7.5 G/DL (ref 6–8.2)
SODIUM SERPL-SCNC: 138 MMOL/L (ref 135–145)
TRIGL SERPL-MCNC: 114 MG/DL (ref 0–149)

## 2023-03-16 PROCEDURE — 80061 LIPID PANEL: CPT

## 2023-03-16 PROCEDURE — 80053 COMPREHEN METABOLIC PANEL: CPT

## 2023-03-16 PROCEDURE — 84153 ASSAY OF PSA TOTAL: CPT | Mod: GA

## 2023-03-16 PROCEDURE — 36415 COLL VENOUS BLD VENIPUNCTURE: CPT | Mod: GA

## 2023-03-17 LAB — PSA SERPL-MCNC: 4.35 NG/ML (ref 0–4)

## 2023-03-21 DIAGNOSIS — E78.5 DYSLIPIDEMIA: ICD-10-CM

## 2023-03-21 RX ORDER — ATORVASTATIN CALCIUM 40 MG/1
TABLET, FILM COATED ORAL
Qty: 90 TABLET | Refills: 3 | Status: SHIPPED | OUTPATIENT
Start: 2023-03-21

## 2023-03-21 NOTE — TELEPHONE ENCOUNTER
Received request via: Pharmacy    Was the patient seen in the last year in this department? Yes  3/8/23  Does the patient have an active prescription (recently filled or refills available) for medication(s) requested? No    Does the patient have [facility] Plus and need 100 day supply (blood pressure, diabetes and cholesterol meds only)? Patient does not have SCP

## 2023-03-30 ENCOUNTER — TELEMEDICINE (OUTPATIENT)
Dept: MEDICAL GROUP | Facility: LAB | Age: 73
End: 2023-03-30
Payer: MEDICARE

## 2023-03-30 VITALS — HEIGHT: 74 IN | BODY MASS INDEX: 30.8 KG/M2 | WEIGHT: 240 LBS

## 2023-03-30 DIAGNOSIS — R73.03 PREDIABETES: ICD-10-CM

## 2023-03-30 DIAGNOSIS — R74.01 ELEVATED AST (SGOT): ICD-10-CM

## 2023-03-30 DIAGNOSIS — R97.20 ELEVATED PSA: ICD-10-CM

## 2023-03-30 PROCEDURE — 99214 OFFICE O/P EST MOD 30 MIN: CPT | Mod: 95 | Performed by: FAMILY MEDICINE

## 2023-03-30 ASSESSMENT — FIBROSIS 4 INDEX: FIB4 SCORE: 1.45

## 2023-03-30 NOTE — PROGRESS NOTES
Virtual Visit: Established Patient   This visit was conducted via Zoom using secure and encrypted videoconferencing technology.   The patient was in their home in the [facility].    The patient's identity was confirmed and verbal consent was obtained for this virtual visit.     Subjective:   CC:   Chief Complaint   Patient presents with    Lab Results       Josh Sykes is a 72 y.o. male presenting for evaluation and management of:    #Prediabetes:  -Chronic longstanding condition, continue to see evidence of prediabetes with an elevated blood glucose of 110 with an A1c of 5.9%.  Patient is working on proving diet and exercise regimen.  No previous treatment with metformin.    #Elevated AST:  -New finding of patient.  Denies any symptoms.  Denies any abdominal pain, nausea, vomiting, diarrhea, constipation.  No increase in alcohol intake.    #Elevated PSA:  -New finding on labs.  PSA elevated slightly above 4.  Patient denies any dysuria, hematuria, pelvic pain, fevers, chills, weight loss, back pain.  Here today to review results.    ROS   -See HPI    Current medicines (including changes today)  Current Outpatient Medications   Medication Sig Dispense Refill    atorvastatin (LIPITOR) 40 MG Tab TAKE 1 TABLET EVERY DAY 90 Tablet 3    scopolamine (TRANSDERM-SCOP, 1.5 MG,) 1 mg/72hr PATCH 72 HR Place 1 Patch on the skin every 72 hours. 1 Patch 3    diclofenac DR (VOLTAREN) 75 MG Tablet Delayed Response Take 1 Tablet by mouth 2 times a day for 360 days. for pain 180 Tablet 3    metoprolol tartrate (LOPRESSOR) 50 MG Tab Take 1 Tablet by mouth 2 times a day for 360 days. 180 Tablet 3    amLODIPine (NORVASC) 5 MG Tab Take 1 Tablet by mouth every day for 360 days. 90 Tablet 3    losartan-hydrochlorothiazide (HYZAAR) 100-25 MG per tablet Take 1 Tablet by mouth every day. 90 Tablet 3    NON SPECIFIED Take 1 Tablet by mouth 2 times a day. (Patient not taking: Reported on 2/21/2023)      fluticasone (FLONASE) 50  "MCG/ACT nasal spray Spray 2 Sprays in nose every day. 16 g 3    Misc Natural Products (FIBER 7 PO) Take  by mouth.      aspirin 81 MG EC tablet Take 81 mg by mouth.      Cholecalciferol 2000 UNIT Cap Take 2,000 Units by mouth.       No current facility-administered medications for this visit.       Patient Active Problem List    Diagnosis Date Noted    Cervical radiculopathy 02/23/2021    Sciatica of right side 01/15/2020    Complete tear of right rotator cuff 02/13/2019    Benign paroxysmal positional vertigo due to bilateral vestibular disorder 09/06/2018    Polyarthritis with positive rheumatoid factor (HCC) 10/25/2017    Chronic pain of both shoulders 10/24/2017    Prediabetes 02/15/2017    GERD (gastroesophageal reflux disease) 02/15/2017    Esophageal stricture 02/15/2017    Essential hypertension 08/20/2013    Dyslipidemia 08/20/2013    CESAR on CPAP 08/20/2013    Family history of premature CAD 08/20/2013        Objective:   Ht 1.88 m (6' 2.02\")   Wt 109 kg (240 lb)   BMI 30.80 kg/m²     Physical Exam:  Constitutional: Alert, no distress, well-groomed.  Skin: No rashes in visible areas.  Eye: Round. Conjunctiva clear, lids normal. No icterus.   ENMT: Lips pink without lesions, good dentition, moist mucous membranes. Phonation normal.  Neck: No masses, no thyromegaly. Moves freely without pain.  Respiratory: Unlabored respiratory effort, no cough or audible wheeze  Psych: Alert and oriented x3, normal affect and mood.     Assessment and Plan:   The following treatment plan was discussed:     1. Prediabetes  -Given history of prediabetes now with an elevated AST and weight gain over the last year, I did recommend starting metformin.  Discussed risk, benefits, alternatives.  Patient is elected to continue working on appropriate diet and exercise regimen declines medication at this time.  We will recheck labs again in 6 months, follow-up at that time to discuss if further intervention is needed.  - Comp " Metabolic Panel; Future  - HEMOGLOBIN A1C; Future    2. Elevated AST (SGOT)  -Discussed appropriate low-carb/low sugar diet, daily aerobic activity.  Discussed importance of exercise.  Recheck AST again in 6 months.  - Comp Metabolic Panel; Future    3. Elevated PSA  -Patient asymptomatic; however, this is a new finding.  We will refer to urology for further evaluation.  - Referral to Urology      Follow-up: No follow-ups on file.

## 2023-09-05 LAB — HBA1C MFR BLD: 5.8 % (ref ?–5.8)

## 2023-11-29 ENCOUNTER — PATIENT MESSAGE (OUTPATIENT)
Dept: HEALTH INFORMATION MANAGEMENT | Facility: OTHER | Age: 73
End: 2023-11-29

## 2024-02-08 DIAGNOSIS — M05.80 POLYARTHRITIS WITH POSITIVE RHEUMATOID FACTOR (HCC): ICD-10-CM

## 2024-02-09 ENCOUNTER — OFFICE VISIT (OUTPATIENT)
Dept: MEDICAL GROUP | Facility: LAB | Age: 74
End: 2024-02-09
Payer: MEDICARE

## 2024-02-09 VITALS
TEMPERATURE: 97.1 F | WEIGHT: 230 LBS | SYSTOLIC BLOOD PRESSURE: 116 MMHG | RESPIRATION RATE: 16 BRPM | BODY MASS INDEX: 29.52 KG/M2 | DIASTOLIC BLOOD PRESSURE: 74 MMHG | HEIGHT: 74 IN | HEART RATE: 56 BPM | OXYGEN SATURATION: 98 %

## 2024-02-09 DIAGNOSIS — J32.1 CHRONIC FRONTAL SINUSITIS: ICD-10-CM

## 2024-02-09 DIAGNOSIS — M05.80 POLYARTHRITIS WITH POSITIVE RHEUMATOID FACTOR (HCC): ICD-10-CM

## 2024-02-09 DIAGNOSIS — R06.02 SOB (SHORTNESS OF BREATH): ICD-10-CM

## 2024-02-09 PROCEDURE — 3074F SYST BP LT 130 MM HG: CPT | Performed by: FAMILY MEDICINE

## 2024-02-09 PROCEDURE — 99214 OFFICE O/P EST MOD 30 MIN: CPT | Performed by: FAMILY MEDICINE

## 2024-02-09 PROCEDURE — 3078F DIAST BP <80 MM HG: CPT | Performed by: FAMILY MEDICINE

## 2024-02-09 RX ORDER — TAMSULOSIN HYDROCHLORIDE 0.4 MG/1
CAPSULE ORAL
COMMUNITY
Start: 2023-10-11

## 2024-02-09 RX ORDER — AMOXICILLIN AND CLAVULANATE POTASSIUM 875; 125 MG/1; MG/1
1 TABLET, FILM COATED ORAL 2 TIMES DAILY
Qty: 14 TABLET | Refills: 0 | Status: SHIPPED | OUTPATIENT
Start: 2024-02-09 | End: 2024-02-16

## 2024-02-09 RX ORDER — DICLOFENAC SODIUM 75 MG/1
TABLET, DELAYED RELEASE ORAL
Qty: 180 TABLET | Refills: 3 | Status: SHIPPED | OUTPATIENT
Start: 2024-02-09

## 2024-02-09 ASSESSMENT — PATIENT HEALTH QUESTIONNAIRE - PHQ9: CLINICAL INTERPRETATION OF PHQ2 SCORE: 0

## 2024-02-09 NOTE — PROGRESS NOTES
Subjective:   Josh Sykes is a 73 y.o. male here today for   Chief Complaint   Patient presents with    Shortness of Breath     Especially up and down on stairs running out of breath, bending over, and then standing up would get light headed.      # Dizziness  -Ongoing symptoms of dizziness for the last 3 weeks. This occurred at night when getting up to use the bathroom. Since then it patient has had several other episodes of dizziness. States that the dizziness is accompanied headache, sinus pressure.  Patient states that the dizziness only occurs when he is standing or going from sitting to standing.  He denies any dizziness when laying down or putting head back.  Patient states that previously had similar symptoms.  He states he went through an extensive workup with MRI, stress test, echocardiogram all of which was unremarkable except for some findings of chronic sinusitis on MRI.    #SOB  -Patient also stated that he has been having some SOB that is occurring with icnreasaed talking, activity.  He states typically he is able to bike 10 miles a day when he is visiting his cabin in [place].  He states that he knows he is not able to do that.  The shortness of breath will come on suddenly.  He states it happens a lot when he is talking to people.  This is also been going on for 3 weeks.  -Denies any palpitations, chest pain, lower extremity swelling, paroxysmal nocturnal dyspnea, wheezing, coughing, fevers, chills.      Allergies   Allergen Reactions    Morphine Unspecified     Hallucinations          Current medicines (including changes today)  Current Outpatient Medications   Medication Sig Dispense Refill    tamsulosin (FLOMAX) 0.4 MG capsule Take 1 capsule every day by oral route at bedtime.      atorvastatin (LIPITOR) 40 MG Tab TAKE 1 TABLET EVERY DAY 90 Tablet 3    scopolamine (TRANSDERM-SCOP, 1.5 MG,) 1 mg/72hr PATCH 72 HR Place 1 Patch on the skin every 72 hours. 1 Patch 3    diclofenac   "(VOLTAREN) 75 MG Tablet Delayed Response Take 1 Tablet by mouth 2 times a day for 360 days. for pain 180 Tablet 3    metoprolol tartrate (LOPRESSOR) 50 MG Tab Take 1 Tablet by mouth 2 times a day for 360 days. 180 Tablet 3    amLODIPine (NORVASC) 5 MG Tab Take 1 Tablet by mouth every day for 360 days. 90 Tablet 3    losartan-hydrochlorothiazide (HYZAAR) 100-25 MG per tablet Take 1 Tablet by mouth every day. 90 Tablet 3    fluticasone (FLONASE) 50 MCG/ACT nasal spray Spray 2 Sprays in nose every day. 16 g 3    Misc Natural Products (FIBER 7 PO) Take  by mouth.      aspirin 81 MG EC tablet Take 81 mg by mouth.      Cholecalciferol 2000 UNIT Cap Take 2,000 Units by mouth.       No current facility-administered medications for this visit.     He  has a past medical history of GERD (gastroesophageal reflux disease) (2/15/2017), Hyperlipidemia, Hypertension, Prediabetes (2/15/2017), Sleep apnea, and Tear of lateral cartilage or meniscus of knee, current (7/25/2012).    ROS   -See HPI       Objective:     Physical Exam:  /74   Pulse (!) 56   Temp 36.2 °C (97.1 °F) (Temporal)   Resp 16   Ht 1.88 m (6' 2\")   Wt 104 kg (230 lb)   SpO2 98%  Body mass index is 29.53 kg/m².   Constitutional: Alert, no distress.  Skin: Warm, dry, good turgor, no rashes in visible areas.  Eye: Equal, round and reactive, conjunctiva clear, lids normal.  ENMT: TM's clear bilaterally, retraction noted on TMs bilaterally, lips without lesions, good dentition, oropharynx clear.  Neck: Trachea midline, no masses, no thyromegaly. No cervical or supraclavicular lymphadenopathy.  Respiratory: Unlabored respiratory effort, lungs clear to auscultation, no wheezes, no rhonchi.  Cardiovascular: Normal S1, S2, no murmur, no edema.  Abdomen: Soft, non-tender, no masses, no hepatosplenomegaly.  Psych: Alert and oriented x3, normal affect and mood.    Assessment and Plan:     1. Chronic frontal sinusitis  -Concern for potential sinusitis at this time.  " Differential diagnosis includes BPPV; however, patient not having any dizziness when laying down or looking back.  Blood pressure is stable.  He will sometimes have orthostatic symptoms but other times he is able to stand up without any difficulty or bend over without any difficulty.  We will go ahead and treat at this time with amoxicillin.  Prescription called in, patient will complete full course over 7 days.  We also discussed completing a course of antibiotics.  Strict return precautions given, patient will follow-up as needed.  - amoxicillin-clavulanate (AUGMENTIN) 875-125 MG Tab; Take 1 Tablet by mouth 2 times a day for 7 days.  Dispense: 14 Tablet; Refill: 0    2. SOB (shortness of breath)  -Uncertain etiology shortness of breath.  Physical examination is benign, vital signs are stable.  Patient shows no signs of heart failure at this time.  No wheezing, rales, rhonchi on physical examination.  Difficult to say if this is a symptom secondary to the sinusitis or not.  We will go ahead and complete chest x-ray to make sure her lungs look appropriate.  We will first treat potential chronic frontal sinusitis.  If symptoms continue after treatment then he is to follow-up with me immediately for further workup.  - DX-CHEST-2 VIEWS; Future    [facility] Form    Diagnosis to address: M05.80 - Polyarthritis with positive rheumatoid factor (HCC)  Assessment and plan: Chronic, stable. Continue with current defined treatment plan: Will continue with diclofenac.  Medication called in for refill.  Patient will follow-up with any questions or concerns.. Follow-up at least annually.  Last edited 02/09/24 14:34 PST by Sheldon Riley M.D.           Followup: No follow-ups on file.         PLEASE NOTE: This dictation was created using voice recognition software. I have made every reasonable attempt to correct obvious errors, but I expect that there are errors of grammar and possibly content that I did not discover before  finalizing the note.

## 2024-02-16 ENCOUNTER — OFFICE VISIT (OUTPATIENT)
Dept: MEDICAL GROUP | Facility: LAB | Age: 74
End: 2024-02-16
Payer: MEDICARE

## 2024-02-16 VITALS
HEIGHT: 74 IN | TEMPERATURE: 96.9 F | BODY MASS INDEX: 30.16 KG/M2 | HEART RATE: 56 BPM | DIASTOLIC BLOOD PRESSURE: 58 MMHG | WEIGHT: 235 LBS | RESPIRATION RATE: 16 BRPM | OXYGEN SATURATION: 96 % | SYSTOLIC BLOOD PRESSURE: 134 MMHG

## 2024-02-16 DIAGNOSIS — R20.0 FACIAL NUMBNESS: ICD-10-CM

## 2024-02-16 DIAGNOSIS — R06.02 SOB (SHORTNESS OF BREATH): ICD-10-CM

## 2024-02-16 PROCEDURE — 3078F DIAST BP <80 MM HG: CPT | Performed by: FAMILY MEDICINE

## 2024-02-16 PROCEDURE — 3075F SYST BP GE 130 - 139MM HG: CPT | Performed by: FAMILY MEDICINE

## 2024-02-16 PROCEDURE — 99214 OFFICE O/P EST MOD 30 MIN: CPT | Performed by: FAMILY MEDICINE

## 2024-02-16 ASSESSMENT — ENCOUNTER SYMPTOMS
DIARRHEA: 0
COUGH: 0
NAUSEA: 0
CLAUDICATION: 0
ABDOMINAL PAIN: 0
ORTHOPNEA: 0
VOMITING: 0
PALPITATIONS: 0
CONSTIPATION: 0
WHEEZING: 0

## 2024-02-16 NOTE — PROGRESS NOTES
Verbal consent was acquired by the patient to use [employer] ambient listening note generation during this visit Yes    Subjective:   Josh Sykes is a 73 y.o. male here today for   No chief complaint on file.    History of Present Illness  The patient presents for evaluation of multiple medical concerns.    He started taking the amoxicillin that was given to him on Friday. On Saturday, he started feeling weird in his jaw and mouth. It gets worse when he is talking. It is quivering. He gets out of breath even with just talking. He had an x-ray, which was normal. He denies any sharp pain in his jaw or mouth. It is not affecting his speech. He denies any difficulty swallowing or eating. He denies any vision changes since the jaw pain started. He has the same pressure that he had before, which he thinks is due to the amoxicillin. He does not seem as dizzy or lightheaded as he was previously. He finished his antibiotic today. He denies any abdominal pain or diarrhea. He denies any pain with chewing or eating. He has some pressure in his chest, but it is not a sharp pain.    When he is walking, his right leg feels like it is starting to get numb. It goes away when he sits down. He is worried about having a blockage. He is still taking atorvastatin and aspirin.   His brother  of a stroke. His father had some blockages and had a heart catheterization in his 40s. His father had another heart attack and passed away at 64. He denies any family history of cancer.        Allergies   Allergen Reactions    Morphine Unspecified     Hallucinations          Current medicines (including changes today)  Current Outpatient Medications   Medication Sig Dispense Refill    diclofenac DR (VOLTAREN) 75 MG Tablet Delayed Response TAKE 1 TABLET TWICE DAILY FOR PAIN 180 Tablet 3    tamsulosin (FLOMAX) 0.4 MG capsule Take 1 capsule every day by oral route at bedtime.      amoxicillin-clavulanate (AUGMENTIN) 875-125 MG Tab Take 1  "Tablet by mouth 2 times a day for 7 days. 14 Tablet 0    atorvastatin (LIPITOR) 40 MG Tab TAKE 1 TABLET EVERY DAY 90 Tablet 3    scopolamine (TRANSDERM-SCOP, 1.5 MG,) 1 mg/72hr PATCH 72 HR Place 1 Patch on the skin every 72 hours. 1 Patch 3    metoprolol tartrate (LOPRESSOR) 50 MG Tab Take 1 Tablet by mouth 2 times a day for 360 days. 180 Tablet 3    amLODIPine (NORVASC) 5 MG Tab Take 1 Tablet by mouth every day for 360 days. 90 Tablet 3    losartan-hydrochlorothiazide (HYZAAR) 100-25 MG per tablet Take 1 Tablet by mouth every day. 90 Tablet 3    fluticasone (FLONASE) 50 MCG/ACT nasal spray Spray 2 Sprays in nose every day. 16 g 3    Misc Natural Products (FIBER 7 PO) Take  by mouth.      aspirin 81 MG EC tablet Take 81 mg by mouth.      Cholecalciferol 2000 UNIT Cap Take 2,000 Units by mouth.       No current facility-administered medications for this visit.     He  has a past medical history of GERD (gastroesophageal reflux disease) (2/15/2017), Hyperlipidemia, Hypertension, Prediabetes (2/15/2017), Sleep apnea, and Tear of lateral cartilage or meniscus of knee, current (7/25/2012).    ROS   Review of Systems   Respiratory:  Negative for cough and wheezing.    Cardiovascular:  Negative for chest pain, palpitations, orthopnea and claudication.   Gastrointestinal:  Negative for abdominal pain, constipation, diarrhea, nausea and vomiting.        Objective:     Physical Exam:  /58   Pulse (!) 56   Temp 36.1 °C (96.9 °F) (Temporal)   Resp 16   Ht 1.88 m (6' 2.02\")   Wt 107 kg (235 lb)   SpO2 96%  Body mass index is 30.16 kg/m².   Constitutional: Alert, no distress.  Skin: Warm, dry, good turgor, no rashes in visible areas.  Eye: Equal, round and reactive, conjunctiva clear, lids normal.  Respiratory: Unlabored respiratory effort, lungs clear to auscultation, no wheezes, no rhonchi.  Cardiovascular: Normal S1, S2, no murmur, no edema.  Abdomen: Soft, non-tender, no masses, no hepatosplenomegaly.  Psych: " Alert and oriented x3, normal affect and mood.    Results  Laboratory Studies  Labs were reviewed with the patient.    Imaging  Chest x-ray was reviewed with the patient.    Assessment and Plan:     Assessment & Plan  1. Facial numbness.  It could be a side effect from the medication. I will order a stat MRI of the brain. If he has any worsening of symptoms, the jaw sensation changes, numbness, or weakness, he is to go to the emergency room.    2. Shortness of breath.  His chest x-ray was normal. I will order a pulmonary function test.      Orders:  1. SOB (shortness of breath)  - PULMONARY FUNCTION TESTS -Test requested: Complete Pulmonary Function Test; Future    2. Facial numbness  - MR-BRAIN-WITH & W/O; Future      Followup: No follow-ups on file.         PLEASE NOTE: This dictation was created using voice recognition software. I have made every reasonable attempt to correct obvious errors, but I expect that there are errors of grammar and possibly content that I did not discover before finalizing the note.

## 2024-02-26 ENCOUNTER — APPOINTMENT (OUTPATIENT)
Dept: SLEEP MEDICINE | Facility: MEDICAL CENTER | Age: 74
End: 2024-02-26
Attending: FAMILY MEDICINE
Payer: MEDICARE

## 2024-02-29 ENCOUNTER — APPOINTMENT (OUTPATIENT)
Dept: SLEEP MEDICINE | Facility: MEDICAL CENTER | Age: 74
End: 2024-02-29
Attending: FAMILY MEDICINE
Payer: MEDICARE

## 2024-03-05 ENCOUNTER — APPOINTMENT (OUTPATIENT)
Dept: SLEEP MEDICINE | Facility: MEDICAL CENTER | Age: 74
End: 2024-03-05
Attending: FAMILY MEDICINE
Payer: MEDICARE

## 2024-03-14 ENCOUNTER — NON-PROVIDER VISIT (OUTPATIENT)
Dept: SLEEP MEDICINE | Facility: MEDICAL CENTER | Age: 74
End: 2024-03-14
Attending: FAMILY MEDICINE
Payer: MEDICARE

## 2024-03-14 VITALS — HEIGHT: 73 IN | WEIGHT: 233 LBS | BODY MASS INDEX: 30.88 KG/M2

## 2024-03-14 DIAGNOSIS — R06.02 SOB (SHORTNESS OF BREATH): ICD-10-CM

## 2024-03-14 PROCEDURE — 94729 DIFFUSING CAPACITY: CPT | Mod: 26 | Performed by: INTERNAL MEDICINE

## 2024-03-14 PROCEDURE — 94726 PLETHYSMOGRAPHY LUNG VOLUMES: CPT | Mod: 26 | Performed by: INTERNAL MEDICINE

## 2024-03-14 PROCEDURE — 94060 EVALUATION OF WHEEZING: CPT | Mod: 26 | Performed by: INTERNAL MEDICINE

## 2024-03-14 PROCEDURE — 94729 DIFFUSING CAPACITY: CPT | Performed by: FAMILY MEDICINE

## 2024-03-14 PROCEDURE — 94726 PLETHYSMOGRAPHY LUNG VOLUMES: CPT | Performed by: FAMILY MEDICINE

## 2024-03-14 PROCEDURE — 94060 EVALUATION OF WHEEZING: CPT | Performed by: FAMILY MEDICINE

## 2024-03-14 ASSESSMENT — PULMONARY FUNCTION TESTS
FEV1/FVC: 70.14
FEV1/FVC_PERCENT_CHANGE: -1
FEV1_PREDICTED: 3.35
FEV1_PERCENT_PREDICTED: 98
FEV1/FVC: 71
FEV1/FVC_PERCENT_PREDICTED: 93
FVC: 4.89
FEV1_PERCENT_PREDICTED: 102
FVC: 4.64
FVC_PERCENT_PREDICTED: 108
FEV1/FVC_PERCENT_CHANGE: 80
FEV1_PERCENT_CHANGE: 4
FEV1/FVC_PERCENT_PREDICTED: 94
FEV1: 3.29
FEV1/FVC: 71
FVC_LLN: 3.77
FEV1_PERCENT_CHANGE: 5
FEV1/FVC: 70
FEV1/FVC_PERCENT_LLN: 62
FEV1_LLN: 2.80
FEV1/FVC_PERCENT_PREDICTED: 74
FEV1/FVC_PREDICTED: 75
FVC_PERCENT_PREDICTED: 102
FEV1: 3.43
FEV1/FVC_PERCENT_PREDICTED: 94
FVC_PREDICTED: 4.51
FEV1/FVC_PERCENT_PREDICTED: 96

## 2024-03-14 NOTE — PROCEDURES
Technician: Stephanie Nice RRT, CPFT  Good patient effort & cooperation.  The results of this test meet the ATS/ERS standards for acceptability & reproducibility.  Test was performed on the [employer] Body Plethysmograph-Elite DX system.  Predicted equations for Spirometry are GLI-2012, ITS for lung volumes, and GLI-2017 for DLCO.  The DLCO was uncorrected for Hgb.  A bronchodilator of Ventolin HFA -2puffs via spacer administered.  DLCO performed during dilation period.    Interpretation:  There is a mild obstructive ventilatory defect on spirometry.  This is most pronounced in the mid flow rates.  There is a partial bronchodilator response, also most pronounced in the mid flow rates.    Lung volumes demonstrate a reduced expiratory reserve volume, likely attributable to reported BMI of 30.7.  Otherwise lung volumes are proportionately supranormal.    Diffusion capacity is similarly supranormal.    Flow-volume loop is consistent with the above interpretation of the mild obstructive ventilatory defect.    No prior PFTs for comparison.    IEdmond M.D. am the author of this note (PFT interpretation).  __________  Edmond Hoff MD  Pulmonary and Critical Care Medicine  [facility]

## 2024-03-18 ENCOUNTER — HOSPITAL ENCOUNTER (OUTPATIENT)
Dept: RADIOLOGY | Facility: MEDICAL CENTER | Age: 74
End: 2024-03-18
Attending: FAMILY MEDICINE
Payer: MEDICARE

## 2024-03-18 DIAGNOSIS — R20.0 FACIAL NUMBNESS: ICD-10-CM

## 2024-03-18 PROCEDURE — 70553 MRI BRAIN STEM W/O & W/DYE: CPT

## 2024-03-18 PROCEDURE — 700117 HCHG RX CONTRAST REV CODE 255: Performed by: FAMILY MEDICINE

## 2024-03-18 PROCEDURE — A9579 GAD-BASE MR CONTRAST NOS,1ML: HCPCS | Performed by: FAMILY MEDICINE

## 2024-03-18 RX ADMIN — GADOTERIDOL 20 ML: 279.3 INJECTION, SOLUTION INTRAVENOUS at 10:23

## 2024-03-20 ENCOUNTER — OFFICE VISIT (OUTPATIENT)
Dept: MEDICAL GROUP | Facility: LAB | Age: 74
End: 2024-03-20
Payer: MEDICARE

## 2024-03-20 VITALS
SYSTOLIC BLOOD PRESSURE: 116 MMHG | DIASTOLIC BLOOD PRESSURE: 70 MMHG | BODY MASS INDEX: 29.77 KG/M2 | HEIGHT: 74 IN | TEMPERATURE: 97.5 F | OXYGEN SATURATION: 95 % | WEIGHT: 232 LBS | HEART RATE: 60 BPM | RESPIRATION RATE: 16 BRPM

## 2024-03-20 DIAGNOSIS — R42 DIZZINESS: ICD-10-CM

## 2024-03-20 DIAGNOSIS — R20.0 FINGER NUMBNESS: ICD-10-CM

## 2024-03-20 DIAGNOSIS — J44.9 OBSTRUCTIVE LUNG DISEASE (GENERALIZED) (HCC): ICD-10-CM

## 2024-03-20 DIAGNOSIS — L02.91 ABSCESS: ICD-10-CM

## 2024-03-20 PROCEDURE — 3074F SYST BP LT 130 MM HG: CPT | Performed by: FAMILY MEDICINE

## 2024-03-20 PROCEDURE — 99214 OFFICE O/P EST MOD 30 MIN: CPT | Performed by: FAMILY MEDICINE

## 2024-03-20 PROCEDURE — 3078F DIAST BP <80 MM HG: CPT | Performed by: FAMILY MEDICINE

## 2024-03-20 RX ORDER — ALBUTEROL SULFATE 90 UG/1
2 AEROSOL, METERED RESPIRATORY (INHALATION) EVERY 4 HOURS PRN
Qty: 1 EACH | Refills: 0 | Status: SHIPPED | OUTPATIENT
Start: 2024-03-20

## 2024-03-20 RX ORDER — FLUTICASONE PROPIONATE 50 MCG
2 SPRAY, SUSPENSION (ML) NASAL DAILY
Qty: 16 G | Refills: 3 | Status: SHIPPED | OUTPATIENT
Start: 2024-03-20

## 2024-03-20 NOTE — PROGRESS NOTES
Verbal consent was acquired by the patient to use [employer] ambient listening note generation during this visit Yes    Subjective:   Josh Sykes is a 73 y.o. male here today for   Chief Complaint   Patient presents with    Lump     On back take a look     Results       History of Present Illness  The patient is here to discuss results of recent MRI and PFTs. MRI was unremarkable; however, PFTs did show mild obstructive ventilatory defect on spirometry with partial bronchodilator response.    He feels a little better. His balance is not good. Last night, he was going to take his garbage out and was looking up the stars when he felt lightheaded and had to sit for a while before he got up.    He went to the dentist to see if he had an infection in his mouth. They took an x-ray and told him that there was some mucus in the MRI. They suggested seeing an ENT or a neurologist to get another opinion. He has less pressure in his head than before, but he still gets a sharp pain once in a while. About 4 or 5 nights ago, he was sitting and watching TV and all of a sudden, he felt weird again. He has some numbness in his mouth. He is worried about a blockage. He sees Dr. Ayers to get his ears cleaned out. He has an appointment with him in 05/2024.    He is due for an eye checkup. His left eye has gotten lazy or tired. He has some double vision at night. This has been going on for a few years. It happens at times when he is driving. He drives straight through to 10 hours. He has neuropathy in his feet.    Patient also complaining of numbness in his middle finger on right hand.  This been ongoing for several months.  He denies any significant weakness but is having difficulty with picking up items due to the numbness in his finger.    He has a lump on his back. It has been there for a couple of days. It is getting smaller. His wife has been picking on it and trying to squeeze it. It was tender and painful, but it is  "getting better right now.   He has a family history of heart attack or stroke.      Allergies   Allergen Reactions    Morphine Unspecified     Hallucinations          Current medicines (including changes today)  Current Outpatient Medications   Medication Sig Dispense Refill    diclofenac DR (VOLTAREN) 75 MG Tablet Delayed Response TAKE 1 TABLET TWICE DAILY FOR PAIN 180 Tablet 3    tamsulosin (FLOMAX) 0.4 MG capsule Take 1 capsule every day by oral route at bedtime.      atorvastatin (LIPITOR) 40 MG Tab TAKE 1 TABLET EVERY DAY 90 Tablet 3    scopolamine (TRANSDERM-SCOP, 1.5 MG,) 1 mg/72hr PATCH 72 HR Place 1 Patch on the skin every 72 hours. 1 Patch 3    losartan-hydrochlorothiazide (HYZAAR) 100-25 MG per tablet Take 1 Tablet by mouth every day. 90 Tablet 3    fluticasone (FLONASE) 50 MCG/ACT nasal spray Spray 2 Sprays in nose every day. 16 g 3    Misc Natural Products (FIBER 7 PO) Take  by mouth.      aspirin 81 MG EC tablet Take 81 mg by mouth.      Cholecalciferol 2000 UNIT Cap Take 2,000 Units by mouth.       No current facility-administered medications for this visit.     He  has a past medical history of GERD (gastroesophageal reflux disease) (2/15/2017), Hyperlipidemia, Hypertension, Prediabetes (2/15/2017), Sleep apnea, and Tear of lateral cartilage or meniscus of knee, current (7/25/2012).    ROS   ROS  -See HPI     Objective:     Physical Exam:  /70   Pulse 60   Temp 36.4 °C (97.5 °F) (Temporal)   Resp 16   Ht 1.88 m (6' 2.02\")   Wt 105 kg (232 lb)   SpO2 95%  Body mass index is 29.77 kg/m².   Constitutional: Alert, no distress, well-groomed.  Skin: No rashes in visible areas.  Eye: Round. Conjunctiva clear, lids normal. No icterus.   ENMT: Lips pink without lesions, good dentition, moist mucous membranes. Phonation normal.  Neck: No masses, no thyromegaly. Moves freely without pain.  Respiratory: Unlabored respiratory effort, no cough or audible wheeze  Psych: Alert and oriented x3, normal " affect and mood.    Results  Imaging  MRI was unremarkable.    Testing  PFTs did show mild obstructive ventilatory defect on spirometry with partial bronchodilator response.    Assessment and Plan:     Assessment & Plan  1. Mild obstructive pulmonary disease.  His PFTs showed mild obstructive disease. I will start him on albuterol. He can use it as needed. If he still does not feel well-controlled with shortness of breath, we will increase his inhaler.    2. Dizziness.  His brain MRI with and without contrast was normal. There are no signs of stroke or prior stroke. He will follow up with ENT.    3. Lump on back.  It looks like a little bit of an abscess, but it is open and draining. It is healing. He will continue to squeeze it. He will use a hot pad. If it starts to get painful or tender despite his wife working on it, he will let us know.    4. Finger numbness.  I will order an EMG.  Uncertain etiology; however, MRI brain was unremarkable.  It does not appear to be cardiac in nature.  No significant history of diabetes.    Follow-up  The patient will follow up in 1 to 2 months.    Orders:  1. Obstructive lung disease (generalized) ([facility])  - albuterol 108 (90 Base) MCG/ACT Aero Soln inhalation aerosol; Inhale 2 Puffs every four hours as needed for Shortness of Breath.  Dispense: 1 Each; Refill: 0    2. Abscess    3. Finger numbness  - Referral to Neurodiagnostics (EEG,EP,EMG/NCS/DBS)    4. Dizziness    Followup: No follow-ups on file.         PLEASE NOTE: This dictation was created using voice recognition and [employer] ambient listening software. I have made every reasonable attempt to correct obvious errors, but I expect that there are errors of grammar and possibly content that I did not discover before finalizing the note.

## 2024-05-06 DIAGNOSIS — I10 ESSENTIAL HYPERTENSION: ICD-10-CM

## 2024-05-07 NOTE — TELEPHONE ENCOUNTER
Received request via: Pharmacy    Was the patient seen in the last year in this department? Yes  3/20/24  Does the patient have an active prescription (recently filled or refills available) for medication(s) requested? No    Pharmacy Name: [facility]    Does the patient have alf Plus and need 100 day supply (blood pressure, diabetes and cholesterol meds only)? Patient does not have SCP

## 2024-05-08 RX ORDER — AMLODIPINE BESYLATE 5 MG/1
5 TABLET ORAL
Qty: 90 TABLET | Refills: 3 | Status: SHIPPED | OUTPATIENT
Start: 2024-05-08

## 2024-05-10 NOTE — PATIENT INSTRUCTIONS
Consider ENT Referral    Labyrinthitis  Introduction  Labyrinthitis is an infection of the inner ear. Your inner ear is a fluid-filled system of tubes and canals (labyrinth). Nerve cells in your inner ear send signals for hearing and balance to your brain. When tiny germs (microorganisms) get inside the labyrinth, they harm the cells that send messages to the brain. Labyrinthitis can cause changes in hearing and balance.  Most cases of labyrinthitis come on suddenly and they clear up within weeks. If the infection damages parts of the labyrinth, some symptoms may remain (chronic labyrinthitis).  What are the causes?  Viruses are the most common cause of labyrinthitis. Viruses that spread into the labyrinth are the same viruses that cause other diseases, such as:  · Mononucleosis.  · Measles.  · Flu.  · Herpes.  Bacteria can also cause labyrinthitis when they spread into the labyrinth from an infection in the brain or the middle ear. Bacteria can cause:  · Serous labyrinthitis. This type of labyrinthitis develops when bacteria produce a poison (toxin) that gets inside the labyrinth.  · Suppurative labyrinthitis. This type of labyrinthitis develops when bacteria get inside the labyrinth.  What increases the risk?  You may be at greater risk for labyrinthitis if you:  · Drink a lot of alcohol.  · Smoke.  · Take certain drugs.  · Are not well rested (fatigued).  · Are under a lot of stress.  · Have allergies.  · Recently had a nose or throat infection (upper respiratory infection) or an ear infection.  What are the signs or symptoms?  Symptoms of labyrinthitis usually start suddenly. The symptoms can be mild or strong and may include:  · Dizziness.  · Hearing loss.  · A feeling that you are moving when you are not (vertigo).  · Ringing in the ear (tinnitus).  · Nausea and vomiting.  · Trouble focusing your eyes.  Symptoms of chronic labyrinthitis may include:  · Fatigue.  · Confusion.  · Hearing  loss.  · Tinnitus.  · Poor balance.  · Vertigo after sudden head movements.  How is this diagnosed?  Your health care provider may suspect labyrinthitis if you suddenly get dizzy and lose hearing, especially if you had a recent upper respiratory infection. Your health care provider will perform a physical exam to:  · Check your ears for infection.  · Test your balance.  · Check your eye movement.  Your health care provider may do several tests to rule out other causes of your symptoms and to help make a diagnosis of labyrinthitis. These may include:  · Imaging studies, such as a CT scan or an MRI, to look for other causes of your symptoms.  · Hearing tests.  · Electronystagmography (ENG) to check your balance.  How is this treated?  Treatment of labyrinthitis depends on the cause. If your labyrinthitis is caused by a virus, it may get better without treatment. If your labyrinthitis is caused by bacteria, you may need medicine to fight the infection (antibiotic medicine). You may also have treatment to relieve labyrinthitis symptoms. Treatments may include:  · Medicines to:  ¨ Stop dizziness.  ¨ Relieve nausea.  ¨ Treat the inflamed area.  ¨ Speed up your recovery.  · Bed rest until dizziness goes away.  · Fluids given through an IV tube. You may need this treatment if you have too little fluid in your body (dehydrated) from repeated nausea and vomiting.  Follow these instructions at home:  · Take medicines only as directed by your health care provider.  · If you were prescribed an antibiotic medicine, finish all of it even if you start to feel better.  · Rest as much as possible.  · Avoid loud noises and bright lights.  · Do not make sudden movements until any dizziness goes away.  · Do not drive until your health care provider says that you can.  · Drink enough fluid to keep your urine clear or pale yellow.  · Work with a physical therapist if you still feel dizzy after several weeks. A therapist can teach you  exercises to help you adjust to feeling dizzy (vestibular rehabilitation exercises).  · Keep all follow-up visits as directed by your health care provider. This is important.  Contact a health care provider if:  · Your symptoms are not relieved by medicines.  · Your symptoms last longer than two weeks.  · You have a fever.  Get help right away if:  · You become very dizzy.  · You have nausea or vomiting that does not go away.  · Your hearing gets much worse very quickly.  This information is not intended to replace advice given to you by your health care provider. Make sure you discuss any questions you have with your health care provider.  Document Released: 01/08/2016 Document Revised: 05/25/2017 Document Reviewed: 08/19/2015  © 2017 [employer]      Dehydration, Adult  Dehydration is a condition in which there is not enough fluid or water in the body. This happens when you lose more fluids than you take in. Important organs, such as the kidneys, brain, and heart, cannot function without a proper amount of fluids. Any loss of fluids from the body can lead to dehydration.  Dehydration can range from mild to severe. This condition should be treated right away to prevent it from becoming severe.  What are the causes?  This condition may be caused by:  · Vomiting.  · Diarrhea.  · Excessive sweating, such as from heat exposure or exercise.  · Not drinking enough fluid, especially:  ¨ When ill.  ¨ While doing activity that requires a lot of energy.  · Excessive urination.  · Fever.  · Infection.  · Certain medicines, such as medicines that cause the body to lose excess fluid (diuretics).  · Inability to access safe drinking water.  · Reduced physical ability to get adequate water and food.  What increases the risk?  This condition is more likely to develop in people:  · Who have a poorly controlled long-term (chronic) illness, such as diabetes, heart disease, or kidney disease.  · Who are age 65 or older.  · Who are  disabled.  · Who live in a place with high altitude.  · Who play endurance sports.  What are the signs or symptoms?  Symptoms of mild dehydration may include:  · Thirst.  · Dry lips.  · Slightly dry mouth.  · Dry, warm skin.  · Dizziness.  Symptoms of moderate dehydration may include:  · Very dry mouth.  · Muscle cramps.  · Dark urine. Urine may be the color of tea.  · Decreased urine production.  · Decreased tear production.  · Heartbeat that is irregular or faster than normal (palpitations).  · Headache.  · Light-headedness, especially when you stand up from a sitting position.  · Fainting (syncope).  Symptoms of severe dehydration may include:  · Changes in skin, such as:  ¨ Cold and clammy skin.  ¨ Blotchy (mottled) or pale skin.  ¨ Skin that does not quickly return to normal after being lightly pinched and released (poor skin turgor).  · Changes in body fluids, such as:  ¨ Extreme thirst.  ¨ No tear production.  ¨ Inability to sweat when body temperature is high, such as in hot weather.  ¨ Very little urine production.  · Changes in vital signs, such as:  ¨ Weak pulse.  ¨ Pulse that is more than 100 beats a minute when sitting still.  ¨ Rapid breathing.  ¨ Low blood pressure.  · Other changes, such as:  ¨ Sunken eyes.  ¨ Cold hands and feet.  ¨ Confusion.  ¨ Lack of energy (lethargy).  ¨ Difficulty waking up from sleep.  ¨ Short-term weight loss.  ¨ Unconsciousness.  How is this diagnosed?  This condition is diagnosed based on your symptoms and a physical exam. Blood and urine tests may be done to help confirm the diagnosis.  How is this treated?  Treatment for this condition depends on the severity. Mild or moderate dehydration can often be treated at home. Treatment should be started right away. Do not wait until dehydration becomes severe. Severe dehydration is an emergency and it needs to be treated in a hospital.  Treatment for mild dehydration may include:  · Drinking more fluids.  · Replacing salts and  minerals in your blood (electrolytes) that you may have lost.  Treatment for moderate dehydration may include:  · Drinking an oral rehydration solution (ORS). This is a drink that helps you replace fluids and electrolytes (rehydrate). It can be found at pharmacies and retail stores.  Treatment for severe dehydration may include:  · Receiving fluids through an IV tube.  · Receiving an electrolyte solution through a feeding tube that is passed through your nose and into your stomach (nasogastric tube, or NG tube).  · Correcting any abnormalities in electrolytes.  · Treating the underlying cause of dehydration.  Follow these instructions at home:  · If directed by your health care provider, drink an ORS:  ¨ Make an ORS by following instructions on the package.  ¨ Start by drinking small amounts, about ½ cup (120 mL) every 5-10 minutes.  ¨ Slowly increase how much you drink until you have taken the amount recommended by your health care provider.  · Drink enough clear fluid to keep your urine clear or pale yellow. If you were told to drink an ORS, finish the ORS first, then start slowly drinking other clear fluids. Drink fluids such as:  ¨ Water. Do not drink only water. Doing that can lead to having too little salt (sodium) in the body (hyponatremia).  ¨ Ice chips.  ¨ Fruit juice that you have added water to (diluted fruit juice).  ¨ Low-calorie sports drinks.  · Avoid:  ¨ Alcohol.  ¨ Drinks that contain a lot of sugar. These include high-calorie sports drinks, fruit juice that is not diluted, and soda.  ¨ Caffeine.  ¨ Foods that are greasy or contain a lot of fat or sugar.  · Take over-the-counter and prescription medicines only as told by your health care provider.  · Do not take sodium tablets. This can lead to having too much sodium in the body (hypernatremia).  · Eat foods that contain a healthy balance of electrolytes, such as bananas, oranges, potatoes, tomatoes, and spinach.  · Keep all follow-up visits as told  by your health care provider. This is important.  Contact a health care provider if:  · You have abdominal pain that:  ¨ Gets worse.  ¨ Stays in one area (localizes).  · You have a rash.  · You have a stiff neck.  · You are more irritable than usual.  · You are sleepier or more difficult to wake up than usual.  · You feel weak or dizzy.  · You feel very thirsty.  · You have urinated only a small amount of very dark urine over 6-8 hours.  Get help right away if:  · You have symptoms of severe dehydration.  · You cannot drink fluids without vomiting.  · Your symptoms get worse with treatment.  · You have a fever.  · You have a severe headache.  · You have vomiting or diarrhea that:  ¨ Gets worse.  ¨ Does not go away.  · You have blood or green matter (bile) in your vomit.  · You have blood in your stool. This may cause stool to look black and tarry.  · You have not urinated in 6-8 hours.  · You faint.  · Your heart rate while sitting still is over 100 beats a minute.  · You have trouble breathing.  This information is not intended to replace advice given to you by your health care provider. Make sure you discuss any questions you have with your health care provider.  Document Released: 12/18/2006 Document Revised: 07/14/2017 Document Reviewed: 02/10/2017  [employer] Interactive Patient Education © 2017 [employer]  Epley Maneuver Self-Care  WHAT IS THE EPLEY MANEUVER?  The Epley maneuver is an exercise you can do to relieve symptoms of benign paroxysmal positional vertigo (BPPV). This condition is often just referred to as vertigo. BPPV is caused by the movement of tiny crystals (canaliths) inside your inner ear. The accumulation and movement of canaliths in your inner ear causes a sudden spinning sensation (vertigo) when you move your head to certain positions. Vertigo usually lasts about 30 seconds. BPPV usually occurs in just one ear. If you get vertigo when you lie on your left side, you probably have BPPV in your  left ear. Your health care provider can tell you which ear is involved.   BPPV may be caused by a head injury. Many people older than 50 get BPPV for unknown reasons. If you have been diagnosed with BPPV, your health care provider may teach you how to do this maneuver. BPPV is not life threatening (benign) and usually goes away in time.   WHEN SHOULD I PERFORM THE EPLEY MANEUVER?  You can do this maneuver at home whenever you have symptoms of vertigo. You may do the Epley maneuver up to 3 times a day until your symptoms of vertigo go away.  HOW SHOULD I DO THE EPLEY MANEUVER?  1. Sit on the edge of a bed or table with your back straight. Your legs should be extended or hanging over the edge of the bed or table.    2. Turn your head correction toward the affected ear.    3. Lie backward quickly with your head turned until you are lying flat on your back. You may want to position a pillow under your shoulders.    4. Hold this position for 30 seconds. You may experience an attack of vertigo. This is normal. Hold this position until the vertigo stops.  5. Then turn your head to the opposite direction until your unaffected ear is facing the floor.    6. Hold this position for 30 seconds. You may experience an attack of vertigo. This is normal. Hold this position until the vertigo stops.  7. Now turn your whole body to the same side as your head. Hold for another 30 seconds.    8. You can then sit back up.  ARE THERE RISKS TO THIS MANEUVER?  In some cases, you may have other symptoms (such as changes in your vision, weakness, or numbness). If you have these symptoms, stop doing the maneuver and call your health care provider. Even if doing these maneuvers relieves your vertigo, you may still have dizziness. Dizziness is the sensation of light-headedness but without the sensation of movement. Even though the Epley maneuver may relieve your vertigo, it is possible that your symptoms will return within 5 years.  WHAT SHOULD I  DO AFTER THIS MANEUVER?  After doing the Epley maneuver, you can return to your normal activities. Ask your doctor if there is anything you should do at home to prevent vertigo. This may include:  · Sleeping with two or more pillows to keep your head elevated.  · Not sleeping on the side of your affected ear.  · Getting up slowly from bed.  · Avoiding sudden movements during the day.  · Avoiding extreme head movement, like looking up or bending over.  · Wearing a cervical collar to prevent sudden head movements.  WHAT SHOULD I DO IF MY SYMPTOMS GET WORSE?  Call your health care provider if your vertigo gets worse. Call your provider right way if you have other symptoms, including:   · Nausea.  · Vomiting.  · Headache.  · Weakness.  · Numbness.  · Vision changes.     This information is not intended to replace advice given to you by your health care provider. Make sure you discuss any questions you have with your health care provider.     Document Released: 12/23/2014 Document Reviewed: 12/23/2014  [employer] Interactive Patient Education ©2016 [employer]    How to Take Your Blood Pressure  Blood pressure is a measurement of how strongly your blood is pressing against the walls of your arteries. Arteries are blood vessels that carry blood from your heart throughout your body. Your health care provider takes your blood pressure at each office visit. You can also take your own blood pressure at home with a blood pressure machine. You may need to take your own blood pressure:  · To confirm a diagnosis of high blood pressure (hypertension).  · To monitor your blood pressure over time.  · To make sure your blood pressure medicine is working.  Supplies needed:  To take your blood pressure, you will need a blood pressure machine. You can buy a blood pressure machine, or blood pressure monitor, at most [employer] or online. There are several types of home blood pressure monitors. When choosing one, consider the  "following:  · Choose a monitor that has an arm cuff.  · Choose a monitor that wraps snugly around your upper arm. You should be able to fit only one finger between your arm and the cuff.  · Do not choose a monitor that measures your blood pressure from your wrist or finger.  Your health care provider can suggest a reliable monitor that will meet your needs.  How to prepare  To get the most accurate reading, avoid the following for 30 minutes before you check your blood pressure:  · Drinking caffeine.  · Drinking alcohol.  · Eating.  · Smoking.  · Exercising.  Five minutes before you check your blood pressure:  · Empty your bladder.  · Sit quietly without talking in a dining chair, rather than in a soft couch or armchair.  How to take your blood pressure  To check your blood pressure, follow the instructions in the manual that came with your blood pressure monitor. If you have a digital blood pressure monitor, the instructions may be as follows:  1. Sit up straight.  2. Place your feet on the floor. Do not cross your ankles or legs.  3. Rest your left arm at the level of your heart on a table or desk or on the arm of a chair.  4. Pull up your shirt sleeve.  5. Wrap the blood pressure cuff around the upper part of your left arm, 1 inch (2.5 cm) above your elbow. It is best to wrap the cuff around bare skin.  6. Fit the cuff snugly around your arm. You should be able to place only one finger between the cuff and your arm.  7. Position the cord inside the groove of your elbow.  8. Press the power button.  9. Sit quietly while the cuff inflates and deflates.  10. Read the digital reading on the monitor screen and write it down (record it).  11. Wait 2-3 minutes, then repeat the steps starting at step 1.  What does my blood pressure reading mean?  A blood pressure reading is recorded as two numbers, such as \"120 over 80\" (or 120/80). The first (\"top\") number is called the systolic pressure. It is a measure of the pressure " "in your arteries as the heart beats. The second (\"bottom\") number is called the diastolic pressure. It is a measure of the pressure in your arteries as the heart relaxes between beats.  Blood pressure is classified into four stages. The following are the stages for adults who do not have a short-term serious illness or a chronic condition. Systolic pressure and diastolic pressure are measured in a unit called mm Hg.  Normal  · Systolic pressure: below 120.  · Diastolic pressure: below 80.  Prehypertension  · Systolic pressure: 120-139.  · Diastolic pressure: 80-89.  Hypertension stage 1  · Systolic pressure: 140-159.  · Diastolic pressure: 90-99.  Hypertension stage 2  · Systolic pressure: 160 or above.  · Diastolic pressure: 100 or above.  You can have prehypertension or hypertension even if only the systolic or only the diastolic number in your reading is higher than normal.  Follow these instructions at home:  · Check your blood pressure as often as recommended by your health care provider.  · Take your monitor to the next appointment with your health care provider to make sure:  ¨ That you are using it correctly.  ¨ That it provides accurate readings.  · Be sure you understand what your goal blood pressure numbers are.  · Tell your health care provider if you are having any side effects from blood pressure medicine.  Contact a health care provider if:  · Your blood pressure is consistently high.  Get help right away if:  · Your systolic blood pressure is higher than 180.  · Your diastolic blood pressure is higher than 110.  This information is not intended to replace advice given to you by your health care provider. Make sure you discuss any questions you have with your health care provider.  Document Released: 05/26/2017 Document Revised: 08/08/2017 Document Reviewed: 05/26/2017  [employer] Interactive Patient Education © 2017 [employer]    " <-- Click to add NO pertinent Past Medical History

## 2024-06-06 ENCOUNTER — NON-PROVIDER VISIT (OUTPATIENT)
Dept: NEUROLOGY | Facility: MEDICAL CENTER | Age: 74
End: 2024-06-06
Attending: SPECIALIST
Payer: MEDICARE

## 2024-06-06 DIAGNOSIS — R20.0 FINGER NUMBNESS: ICD-10-CM

## 2024-06-06 PROCEDURE — 95886 MUSC TEST DONE W/N TEST COMP: CPT | Performed by: SPECIALIST

## 2024-06-06 PROCEDURE — 95908 NRV CNDJ TST 3-4 STUDIES: CPT | Performed by: SPECIALIST

## 2024-06-06 PROCEDURE — 95908 NRV CNDJ TST 3-4 STUDIES: CPT | Mod: 26 | Performed by: SPECIALIST

## 2024-06-06 PROCEDURE — 95886 MUSC TEST DONE W/N TEST COMP: CPT | Mod: 26 | Performed by: SPECIALIST

## 2024-06-06 NOTE — PROCEDURES
NERVE CONDUCTION STUDIES AND ELECTROMYOGRAPHY REPORT        06/06/24      Referring provider: Sheldon Riley M.D.      SUMMARY OF PATIENT'S CLINICAL HISTORY,PHYSICAL EXAM, AND RATIONALE FOR TESTING:    Mr. Josh Sykes 73 y.o. presenting with ***    Past Medical History is significant for :   Past Medical History:   Diagnosis Date    GERD (gastroesophageal reflux disease) 2/15/2017    Hyperlipidemia     Hypertension     Prediabetes 2/15/2017    Sleep apnea     Tear of lateral cartilage or meniscus of knee, current 7/25/2012       A brief general examination was remarkable/unremarkable for *** . A brief neurological examination {DEMONSTRATED/DID NOT DEMONSTRATE:64170} strength, *** sensory and *** deep tendon reflexes.    The electrodiagnostic studies were performed to evaluate for possible ***        ELECTRODIAGNOSTIC EXAMINATION:  Nerve conduction studies (NCS) and electromyography (EMG) are utilized to evaluate direct or indirect damage to the peripheral nervous system. NCS are performed to measure the nerve(s) response(s) to electrostimulation across a given nerve segment. EMG evaluates the passive and active electrical activity of the muscle(s) in question.  Muscles are innervated by specific peripheral nerves and roots. Often times, several nerves the muscle to be examined in order to determine the presence or absence of the disease process. Furthermore, nerves and muscles may need to be tested in a [id number] comparison, as well as in additional extremities, as this may be crucial in characterizing the extent of the disease process, which may be diffuse or isolated and of varying degree of severity. The extent of the neurodiagnostic exam is justified as it may help arrive to a proper diagnosis, which ultimately may contribute to better management of the patient. Therefore, the nerves to muscles examined during the study were medically necessary.    Unless otherwise noted, temperature of  the extremity(s) study was monitored before and during the examination and remained between 32 and 36 degrees C for the upper extremities, and between 30 and 36 degrees C for the lower extremities.      NERVE CONDUCTION STUDIES:  Sensory nerves: ***  - Bilateral Median sensory nerves were examined.The responses were within normal limits.  - Bilateral Ulnar sensory nerves were examined. The responses were within normal limits.  - Bilateral Sural nerves were tested. The responses were within normal limits.    Motor nerves: ***  - Bilateral Median motor nerves were examined. Recording electrodes placed at the Abductor Pollicis Brevis muscles. The responses were within normal limits.  - Bilateral Ulnar motor nerves were examined. Recording electrodes placed at the Abductor Digiti Minimi muscles. The responses were within normal limits.  - Bilateral Tibial nerves were examined. Recording electrodes placed at the Abductor     Hallucis muscles. The responses were within normal limits.  - Bilateral Deep Peroneal motor nerves were examined. Recording electrodes were placed at the Extensor Digitorum Brevis muscles.The responses were within normal limits.     No temporal dispersion or conduction block observed in any of the motor nerves examined.    LATE RESPONSES:  F waves were obtained and the following nerves:  The responses within normal limits for the patient's age.    Bilateral H reflexes were tested:      ELECTROMYOGRAPHY:  The study was performed the concentric needle electrode. Fibrillation and fasciculation activity is graded by convention from none (0) to continuous (4+).  Needle electrode examination was performed in the following muscles:***  The muscles tested demonstrated normal inserctional activity, normal motor unit morphology and recruitment. There were no elements suggestive of active denervation.        DIAGNOSTIC INTERPRETATION: ***  Extensive electrodiagnostic studies were performed to the ***. The  studies were within normal limits.      CLINICAL DISCUSSION: ***      CRISTIAN Yen M.D.(No note.)

## 2024-06-06 NOTE — PROCEDURES
NERVE CONDUCTION STUDIES AND ELECTROMYOGRAPHY REPORT        06/06/24      Referring provider: Sheldon Riley M.D.      SUMMARY OF PATIENT'S CLINICAL HISTORY,PHYSICAL EXAM, AND RATIONALE FOR TESTING:    Mr. Josh Sykes 73 y.o. presenting with numbness in the right hand.    Past Medical History is significant for :   Past Medical History:   Diagnosis Date    GERD (gastroesophageal reflux disease) 2/15/2017    Hyperlipidemia     Hypertension     Prediabetes 2/15/2017    Sleep apnea     Tear of lateral cartilage or meniscus of knee, current 7/25/2012           The electrodiagnostic studies were performed to evaluate for possible peripheral neuropathy versus radiculopathy.      ELECTRODIAGNOSTIC EXAMINATION:  Nerve conduction studies (NCS) and electromyography (EMG) are utilized to evaluate direct or indirect damage to the peripheral nervous system. NCS are performed to measure the nerve(s) response(s) to electrostimulation across a given nerve segment. EMG evaluates the passive and active electrical activity of the muscle(s) in question.  Muscles are innervated by specific peripheral nerves and roots. Often times, several nerves the muscle to be examined in order to determine the presence or absence of the disease process. Furthermore, nerves and muscles may need to be tested in a [id number] comparison, as well as in additional extremities, as this may be crucial in characterizing the extent of the disease process, which may be diffuse or isolated and of varying degree of severity. The extent of the neurodiagnostic exam is justified as it may help arrive to a proper diagnosis, which ultimately may contribute to better management of the patient. Therefore, the nerves to muscles examined during the study were medically necessary.    Unless otherwise noted, temperature of the extremity(s) study was monitored before and during the examination and remained between 32 and 36 degrees C for the upper  extremities, and between 30 and 36 degrees C for the lower extremities.      NERVE CONDUCTION STUDIES:  Sensory nerves:   -Right median sensory nerve was examined.The response was not elicitable with antidromic stimuli.  -Right ulnar sensory nerve was examined. The response was within normal limits.      Motor nerves:   -Right median motor nerve was examined. Recording electrodes placed at the Abductor Pollicis Brevis muscles. The response was abnormal, onset latency was prolonged at 5.1 ms with a normal amplitude and conduction velocity.  -Right ulnar motor nerve was examined. Recording electrodes placed at the Abductor Digiti Minimi muscles. The response was within normal limits.    No temporal dispersion or conduction block observed in any of the motor nerves examined.        ELECTROMYOGRAPHY:  The study was performed the concentric needle electrode. Fibrillation and fasciculation activity is graded by convention from none (0) to continuous (4+).  Needle electrode examination was performed in the following muscles: Right deltoid, biceps, triceps, first dorsal interosseous, abductor pollicis brevis.  The muscles tested demonstrated normal insertional activity, normal motor unit morphology and recruitment. There were no elements suggestive of active denervation.      Nerve Conduction Studies     Stim Site NR Onset (ms) Norm Onset (ms) O-P Amp (µV) Norm O-P Amp Site1 Site2 Delta-P (ms) Dist (cm) Harvey (m/s) Norm Harvey (m/s)   Right Median Anti Sensory (2nd Digit)   Wrist *NR  <3.8  >10 Wrist 2nd Digit  14.0  >50   Right Ulnar Anti Sensory (5th Digit)   Wrist    2.8 <3.2 5.8 >5 Wrist 5th Digit 3.6 14.0 *39 >50        Stim Site NR Onset (ms) Norm Onset (ms) O-P Amp (mV) Norm O-P Amp Site1 Site2 Delta-0 (ms) Dist (cm) Harvey (m/s) Norm Harvey (m/s)   Right Median Motor (Abd Poll Brev)   Wrist    *5.1 <4 9.0 >5 Elbow Wrist 6.7 34.0 51 >50   Elbow    11.8  8.1          Right Ulnar Motor (Abd Dig Min)   Wrist    3.0 <3.1 8.4 >7 B  Elbow Wrist 5.0 26.0 52 >50   B Elbow    8.0  8.3  A Elbow B Elbow 1.3 10.0 77    A Elbow    9.3  7.8                        Electromyography     Side Muscle Nerve Root Ins Act Fibs Psw Amp Dur Poly Recrt Int Pat Comment   Right Deltoid Axillary C5-6 Nml Nml Nml Nml Nml 0 Nml Nml    Right Biceps Musculocut C5-6 Nml Nml Nml Nml Nml 0 Nml Nml    Right Triceps Radial C6-7-8 Nml Nml Nml Nml Nml 0 Nml Nml    Right 1stDorInt Ulnar C8-T1 Nml Nml Nml Nml Nml 0 Nml Nml    Right Abd Poll Brev Median C8-T1 Nml Nml Nml Nml Nml 0 Nml Nml            DIAGNOSTIC INTERPRETATION:   Extensive electrodiagnostic studies were performed to the right upper extremity.  The results are as follows:    1.  Right carpal tunnel syndrome which is of moderate severity electrophysiologically and not associated with motor unit changes in ulnar nerve supplied hand muscles.    2.  Normal right ulnar nerve motor and sensory conduction studies.    3.  No evidence of radiculopathy in selected muscles studied right upper extremity.          CRISTIAN Yen M.D.